# Patient Record
Sex: FEMALE | Race: WHITE | NOT HISPANIC OR LATINO | URBAN - NONMETROPOLITAN AREA
[De-identification: names, ages, dates, MRNs, and addresses within clinical notes are randomized per-mention and may not be internally consistent; named-entity substitution may affect disease eponyms.]

---

## 2017-01-23 RX ORDER — MEMANTINE HYDROCHLORIDE 5 MG/1
TABLET ORAL
Qty: 30 TABLET | Refills: 5 | Status: SHIPPED | OUTPATIENT
Start: 2017-01-23 | End: 2017-03-02 | Stop reason: SDUPTHER

## 2017-01-23 RX ORDER — WARFARIN SODIUM 5 MG/1
TABLET ORAL
Qty: 30 TABLET | Refills: 5 | Status: SHIPPED | OUTPATIENT
Start: 2017-01-23 | End: 2017-03-02 | Stop reason: SDUPTHER

## 2017-01-30 RX ORDER — SIMVASTATIN 40 MG
40 TABLET ORAL DAILY
Qty: 30 TABLET | Refills: 5 | Status: SHIPPED | OUTPATIENT
Start: 2017-01-30 | End: 2017-03-02 | Stop reason: SDUPTHER

## 2017-01-30 RX ORDER — PAROXETINE HYDROCHLORIDE 20 MG/1
TABLET, FILM COATED ORAL
Qty: 90 TABLET | Refills: 1 | Status: SHIPPED | OUTPATIENT
Start: 2017-01-30 | End: 2017-03-02 | Stop reason: SDUPTHER

## 2017-01-30 RX ORDER — HYDROXYUREA 500 MG/1
500 CAPSULE ORAL DAILY
Qty: 45 CAPSULE | Refills: 5 | Status: SHIPPED | OUTPATIENT
Start: 2017-01-30 | End: 2017-03-02 | Stop reason: SDUPTHER

## 2017-01-30 RX ORDER — DONEPEZIL HYDROCHLORIDE 10 MG/1
10 TABLET, FILM COATED ORAL NIGHTLY
Qty: 30 TABLET | Refills: 5 | Status: SHIPPED | OUTPATIENT
Start: 2017-01-30 | End: 2017-03-02 | Stop reason: SDUPTHER

## 2017-03-02 ENCOUNTER — OFFICE VISIT (OUTPATIENT)
Dept: FAMILY MEDICINE CLINIC | Facility: CLINIC | Age: 67
End: 2017-03-02

## 2017-03-02 VITALS
WEIGHT: 127.4 LBS | SYSTOLIC BLOOD PRESSURE: 128 MMHG | HEIGHT: 62 IN | HEART RATE: 102 BPM | DIASTOLIC BLOOD PRESSURE: 70 MMHG | BODY MASS INDEX: 23.45 KG/M2 | OXYGEN SATURATION: 93 %

## 2017-03-02 DIAGNOSIS — G47.00 INSOMNIA, UNSPECIFIED TYPE: Primary | Chronic | ICD-10-CM

## 2017-03-02 DIAGNOSIS — E11.9 TYPE 2 DIABETES MELLITUS WITHOUT COMPLICATION, UNSPECIFIED LONG TERM INSULIN USE STATUS: Chronic | ICD-10-CM

## 2017-03-02 DIAGNOSIS — F17.200 SMOKER: Chronic | ICD-10-CM

## 2017-03-02 DIAGNOSIS — F32.A DEPRESSION, UNSPECIFIED DEPRESSION TYPE: Chronic | ICD-10-CM

## 2017-03-02 DIAGNOSIS — I63.9 CEREBROVASCULAR ACCIDENT (CVA), UNSPECIFIED MECHANISM (HCC): ICD-10-CM

## 2017-03-02 DIAGNOSIS — F17.200 HEAVY SMOKER: ICD-10-CM

## 2017-03-02 DIAGNOSIS — Z12.2 ENCOUNTER FOR SCREENING FOR LUNG CANCER: ICD-10-CM

## 2017-03-02 DIAGNOSIS — F03.90 DEMENTIA WITHOUT BEHAVIORAL DISTURBANCE, UNSPECIFIED DEMENTIA TYPE: Chronic | ICD-10-CM

## 2017-03-02 DIAGNOSIS — Z12.31 VISIT FOR SCREENING MAMMOGRAM: ICD-10-CM

## 2017-03-02 DIAGNOSIS — K21.00 REFLUX ESOPHAGITIS: Chronic | ICD-10-CM

## 2017-03-02 DIAGNOSIS — J44.9 CHRONIC OBSTRUCTIVE PULMONARY DISEASE, UNSPECIFIED COPD TYPE (HCC): Chronic | ICD-10-CM

## 2017-03-02 DIAGNOSIS — Z12.2 ENCOUNTER FOR SCREENING FOR CANCER OF RESPIRATORY ORGANS: ICD-10-CM

## 2017-03-02 DIAGNOSIS — E78.5 HYPERLIPIDEMIA, UNSPECIFIED: Chronic | ICD-10-CM

## 2017-03-02 DIAGNOSIS — Z00.00 ROUTINE MEDICAL EXAM: ICD-10-CM

## 2017-03-02 PROCEDURE — 99214 OFFICE O/P EST MOD 30 MIN: CPT | Performed by: INTERNAL MEDICINE

## 2017-03-02 RX ORDER — FLUOROURACIL 50 MG/G
CREAM TOPICAL DAILY
Qty: 40 G | Refills: 5 | Status: SHIPPED | OUTPATIENT
Start: 2017-03-02 | End: 2017-10-02

## 2017-03-02 RX ORDER — MEMANTINE HYDROCHLORIDE 5 MG/1
TABLET ORAL
Qty: 30 TABLET | Refills: 5 | Status: SHIPPED | OUTPATIENT
Start: 2017-03-02 | End: 2017-08-15 | Stop reason: SDUPTHER

## 2017-03-02 RX ORDER — WARFARIN SODIUM 5 MG/1
TABLET ORAL
Qty: 30 TABLET | Refills: 5 | Status: SHIPPED | OUTPATIENT
Start: 2017-03-02 | End: 2017-08-15 | Stop reason: SDUPTHER

## 2017-03-02 RX ORDER — PANTOPRAZOLE SODIUM 40 MG/1
40 TABLET, DELAYED RELEASE ORAL DAILY
Qty: 30 TABLET | Refills: 5 | Status: SHIPPED | OUTPATIENT
Start: 2017-03-02 | End: 2017-08-15 | Stop reason: SDUPTHER

## 2017-03-02 RX ORDER — ZOLPIDEM TARTRATE 10 MG/1
10 TABLET ORAL NIGHTLY PRN
Qty: 30 TABLET | Refills: 5 | Status: SHIPPED | OUTPATIENT
Start: 2017-03-02 | End: 2017-08-15 | Stop reason: SDUPTHER

## 2017-03-02 RX ORDER — HYDROXYUREA 500 MG/1
500 CAPSULE ORAL DAILY
Qty: 45 CAPSULE | Refills: 5 | Status: SHIPPED | OUTPATIENT
Start: 2017-03-02 | End: 2017-08-15 | Stop reason: SDUPTHER

## 2017-03-02 RX ORDER — RANITIDINE 150 MG/1
150 TABLET ORAL DAILY
Qty: 30 TABLET | Refills: 5 | Status: SHIPPED | OUTPATIENT
Start: 2017-03-02 | End: 2017-08-15

## 2017-03-02 RX ORDER — PAROXETINE HYDROCHLORIDE 20 MG/1
20 TABLET, FILM COATED ORAL DAILY
Qty: 90 TABLET | Refills: 1 | Status: SHIPPED | OUTPATIENT
Start: 2017-03-02 | End: 2017-08-15 | Stop reason: SDUPTHER

## 2017-03-02 RX ORDER — VALSARTAN 80 MG/1
80 TABLET ORAL DAILY
Qty: 30 TABLET | Refills: 5 | Status: SHIPPED | OUTPATIENT
Start: 2017-03-02 | End: 2017-08-15

## 2017-03-02 RX ORDER — SIMVASTATIN 40 MG
40 TABLET ORAL DAILY
Qty: 30 TABLET | Refills: 5 | Status: SHIPPED | OUTPATIENT
Start: 2017-03-02 | End: 2017-08-15 | Stop reason: SDUPTHER

## 2017-03-02 RX ORDER — CHOLECALCIFEROL (VITAMIN D3) 125 MCG
50000 TABLET ORAL 2 TIMES WEEKLY
Qty: 8 TABLET | Refills: 5 | Status: SHIPPED | OUTPATIENT
Start: 2017-03-02 | End: 2017-08-15

## 2017-03-02 RX ORDER — WARFARIN SODIUM 1 MG/1
TABLET ORAL
Qty: 60 TABLET | Refills: 5 | Status: SHIPPED | OUTPATIENT
Start: 2017-03-02 | End: 2017-08-15 | Stop reason: SDUPTHER

## 2017-03-02 RX ORDER — DONEPEZIL HYDROCHLORIDE 10 MG/1
10 TABLET, FILM COATED ORAL NIGHTLY
Qty: 30 TABLET | Refills: 5 | Status: SHIPPED | OUTPATIENT
Start: 2017-03-02 | End: 2017-08-15 | Stop reason: SDUPTHER

## 2017-03-02 NOTE — PROGRESS NOTES
Subjective   Nona Hernandez is a 66 y.o. female.     Chief Complaint   Patient presents with   • Med Refill     6  month med refill        History of Present Illness     Pt in f/u on depression, hyperlipidemia, DM, and reflux.    Pt says she is doing pretty good.  Her memory is doing pretty good, except when she runs out of her medications.  She went about a week with out the medication and she had some problems with the memory, but otherwise she does good.          The following portions of the patient's history were reviewed and updated as appropriate: allergies, current medications, past family history, past medical history, past social history, past surgical history and problem list.    Review of Systems   Constitutional: Negative for activity change, appetite change, fatigue and unexpected weight change.   HENT: Negative for ear pain, facial swelling and hearing loss.    Respiratory: Positive for wheezing. Negative for cough, chest tightness and shortness of breath.    Cardiovascular: Negative for chest pain, palpitations and leg swelling.   Gastrointestinal: Negative for abdominal pain.   Genitourinary: Negative for difficulty urinating, dysuria, flank pain, frequency and urgency.   Musculoskeletal: Negative for arthralgias and back pain.   Skin: Negative for color change and rash.   Allergic/Immunologic: Negative for environmental allergies and food allergies.   Neurological: Negative for dizziness, syncope, weakness, numbness and headaches.   Hematological: Negative for adenopathy.   Psychiatric/Behavioral: Negative for confusion, decreased concentration, dysphoric mood, sleep disturbance and suicidal ideas. The patient is not nervous/anxious.    All other systems reviewed and are negative.      Objective   Physical Exam   Constitutional: She is oriented to person, place, and time. Vital signs are normal. She appears well-developed and well-nourished.   HENT:   Head: Normocephalic.   Right Ear: External ear  normal.   Left Ear: External ear normal.   Nose: Nose normal.   Mouth/Throat: Oropharynx is clear and moist.   Eyes: Conjunctivae and EOM are normal. Pupils are equal, round, and reactive to light.   Neck: Normal range of motion. Neck supple. No JVD present. No thyromegaly present.   Cardiovascular: Normal rate, regular rhythm, normal heart sounds and intact distal pulses.    No murmur heard.  Pulmonary/Chest: Effort normal. No respiratory distress. She has wheezes. She has no rales. She exhibits no tenderness.   Abdominal: Soft. Bowel sounds are normal. She exhibits no distension and no mass. There is no tenderness. There is no rebound and no guarding. No hernia.   Musculoskeletal: Normal range of motion. She exhibits no edema, tenderness or deformity.   Lymphadenopathy:     She has no cervical adenopathy.   Neurological: She is alert and oriented to person, place, and time. No cranial nerve deficit. Coordination normal.   Skin: Skin is warm and dry. No rash noted. No pallor.   Psychiatric: She has a normal mood and affect. Her behavior is normal. Judgment and thought content normal. Her mood appears not anxious. She does not exhibit a depressed mood. She expresses no homicidal and no suicidal ideation. She expresses no suicidal plans and no homicidal plans.   Nursing note and vitals reviewed.      Assessment/Plan   Nona was seen today for med refill.    Diagnoses and all orders for this visit:    Insomnia, unspecified type    Depression, unspecified depression type    Hyperlipidemia, unspecified    Reflux esophagitis    Type 2 diabetes mellitus without complication, unspecified long term insulin use status  -     Protein, Urine, Random; Future  -     Hemoglobin A1c; Future  -     Basic Metabolic Panel  -     MicroAlbumin, Urine, Random; Future    Chronic obstructive pulmonary disease, unspecified COPD type    Dementia without behavioral disturbance, unspecified dementia type    Encounter for screening for lung  cancer    Routine medical exam  -     CBC & Differential  -     TSH; Future  -     Comprehensive Metabolic Panel; Future  -     Lipid Panel; Future  -     T4; Future    Encounter for screening for cancer of respiratory organs    Visit for screening mammogram  -     Mammo screening digital tomosynthesis bilateral w CAD; Future    Smoker    Cerebrovascular accident (CVA), unspecified mechanism    Other orders  -     donepezil (ARICEPT) 10 MG tablet; Take 1 tablet by mouth Every Night.  -     Ergocalciferol (VITAMIN D2) 2000 UNITS tablet; Take 50,000 Units by mouth 2 (Two) Times a Week.  -     fluorouracil (EFUDEX) 5 % cream; Apply  topically Daily.  -     hydroxyurea (HYDREA) 500 MG capsule; Take 1 capsule by mouth Daily. M-SUN, MWF TAKES 2 times daily  -     memantine (NAMENDA) 5 MG tablet; Take 1 tablet daily  -     metFORMIN (GLUCOPHAGE) 500 MG tablet; Take 1 tablet by mouth 2 (Two) Times a Day With Meals.  -     pantoprazole (PROTONIX) 40 MG EC tablet; Take 1 tablet by mouth Daily.  -     PARoxetine (PAXIL) 20 MG tablet; Take 1 tablet by mouth Daily.  -     raNITIdine (ZANTAC) 150 MG tablet; Take 1 tablet by mouth Daily.  -     simvastatin (ZOCOR) 40 MG tablet; Take 1 tablet by mouth Daily.  -     valsartan (DIOVAN) 80 MG tablet; Take 1 tablet by mouth Daily.  -     warfarin (COUMADIN) 1 MG tablet; Take 2 tablets by mouth daily  -     warfarin (COUMADIN) 5 MG tablet; Take 1 tablet daily  -     zolpidem (AMBIEN) 10 MG tablet; Take 1 tablet by mouth At Night As Needed for sleep.  -     Cancel: CT Chest Low Dose Wo; Future        Refill medications    Recommended to the patient to quit smoking.  Explained risks of continuing, and benefits of quitting.   She smokes about a pack a day.      CT Screen  Schedule Low Dose CT Screen - Greater than 30 packs a year history and patient is between the ages of 55 and 80 years old.  You must use ICD 10 code Z12.2     DM labs due: CBC CMP Lipids Thyroids A1C Urine Protein and  Micro Albumin  RTC 2 weeks after the labs to review them     It is recommended to this patient that they get a Diabetic eye exam and a Diabetic foot exam every year.      Schedule Mammo     Scribed for Dr. Gonzalez by Roshni Gonsalez 03/02/17

## 2017-04-03 RX ORDER — VALSARTAN 80 MG/1
TABLET ORAL
Qty: 30 TABLET | Refills: 5 | Status: SHIPPED | OUTPATIENT
Start: 2017-04-03 | End: 2017-08-15 | Stop reason: SDUPTHER

## 2017-04-19 RX ORDER — ALBUTEROL SULFATE 90 UG/1
2 AEROSOL, METERED RESPIRATORY (INHALATION) 4 TIMES DAILY PRN
Qty: 2 INHALER | Refills: 5 | Status: SHIPPED | OUTPATIENT
Start: 2017-04-19 | End: 2017-08-15 | Stop reason: SDUPTHER

## 2017-06-05 RX ORDER — TIOTROPIUM BROMIDE 18 UG/1
CAPSULE ORAL; RESPIRATORY (INHALATION)
Qty: 90 CAPSULE | Refills: 1 | Status: SHIPPED | OUTPATIENT
Start: 2017-06-05 | End: 2017-08-15 | Stop reason: SDUPTHER

## 2017-08-15 ENCOUNTER — OFFICE VISIT (OUTPATIENT)
Dept: FAMILY MEDICINE CLINIC | Facility: CLINIC | Age: 67
End: 2017-08-15

## 2017-08-15 VITALS
WEIGHT: 122.4 LBS | SYSTOLIC BLOOD PRESSURE: 110 MMHG | HEART RATE: 87 BPM | TEMPERATURE: 98.5 F | OXYGEN SATURATION: 96 % | DIASTOLIC BLOOD PRESSURE: 62 MMHG | BODY MASS INDEX: 22.52 KG/M2 | HEIGHT: 62 IN

## 2017-08-15 DIAGNOSIS — J44.9 CHRONIC OBSTRUCTIVE PULMONARY DISEASE, UNSPECIFIED COPD TYPE (HCC): Chronic | ICD-10-CM

## 2017-08-15 DIAGNOSIS — E11.9 TYPE 2 DIABETES MELLITUS WITHOUT COMPLICATION, WITHOUT LONG-TERM CURRENT USE OF INSULIN (HCC): ICD-10-CM

## 2017-08-15 DIAGNOSIS — F32.A DEPRESSION, UNSPECIFIED DEPRESSION TYPE: Chronic | ICD-10-CM

## 2017-08-15 DIAGNOSIS — K21.00 REFLUX ESOPHAGITIS: Chronic | ICD-10-CM

## 2017-08-15 DIAGNOSIS — E78.5 HYPERLIPIDEMIA, UNSPECIFIED HYPERLIPIDEMIA TYPE: ICD-10-CM

## 2017-08-15 DIAGNOSIS — F03.90 DEMENTIA WITHOUT BEHAVIORAL DISTURBANCE, UNSPECIFIED DEMENTIA TYPE: Chronic | ICD-10-CM

## 2017-08-15 DIAGNOSIS — G47.00 INSOMNIA, UNSPECIFIED TYPE: Primary | Chronic | ICD-10-CM

## 2017-08-15 DIAGNOSIS — E78.5 HYPERLIPIDEMIA, UNSPECIFIED: Chronic | ICD-10-CM

## 2017-08-15 DIAGNOSIS — Z79.899 LONG TERM USE OF DRUG: ICD-10-CM

## 2017-08-15 DIAGNOSIS — E11.9 TYPE 2 DIABETES MELLITUS WITHOUT COMPLICATION, UNSPECIFIED LONG TERM INSULIN USE STATUS: Chronic | ICD-10-CM

## 2017-08-15 DIAGNOSIS — I10 ESSENTIAL HYPERTENSION: Primary | ICD-10-CM

## 2017-08-15 PROCEDURE — 99214 OFFICE O/P EST MOD 30 MIN: CPT | Performed by: INTERNAL MEDICINE

## 2017-08-15 RX ORDER — VALSARTAN 80 MG/1
80 TABLET ORAL DAILY
Qty: 30 TABLET | Refills: 5 | Status: SHIPPED | OUTPATIENT
Start: 2017-08-15 | End: 2018-05-11 | Stop reason: SDUPTHER

## 2017-08-15 RX ORDER — ZOLPIDEM TARTRATE 10 MG/1
10 TABLET ORAL NIGHTLY PRN
Qty: 30 TABLET | Refills: 5 | Status: SHIPPED | OUTPATIENT
Start: 2017-08-15 | End: 2018-05-11 | Stop reason: SDUPTHER

## 2017-08-15 RX ORDER — MEMANTINE HYDROCHLORIDE 5 MG/1
TABLET ORAL
Qty: 30 TABLET | Refills: 5 | Status: SHIPPED | OUTPATIENT
Start: 2017-08-15 | End: 2018-05-11 | Stop reason: SDUPTHER

## 2017-08-15 RX ORDER — WARFARIN SODIUM 1 MG/1
TABLET ORAL
Qty: 60 TABLET | Refills: 5 | Status: SHIPPED | OUTPATIENT
Start: 2017-08-15 | End: 2017-11-20 | Stop reason: SDUPTHER

## 2017-08-15 RX ORDER — PAROXETINE HYDROCHLORIDE 20 MG/1
20 TABLET, FILM COATED ORAL DAILY
Qty: 30 TABLET | Refills: 5 | Status: SHIPPED | OUTPATIENT
Start: 2017-08-15 | End: 2018-01-22 | Stop reason: SDUPTHER

## 2017-08-15 RX ORDER — DONEPEZIL HYDROCHLORIDE 10 MG/1
10 TABLET, FILM COATED ORAL NIGHTLY
Qty: 30 TABLET | Refills: 5 | Status: SHIPPED | OUTPATIENT
Start: 2017-08-15 | End: 2018-01-22 | Stop reason: SDUPTHER

## 2017-08-15 RX ORDER — ALBUTEROL SULFATE 90 UG/1
2 AEROSOL, METERED RESPIRATORY (INHALATION) 4 TIMES DAILY PRN
Qty: 2 INHALER | Refills: 5 | Status: SHIPPED | OUTPATIENT
Start: 2017-08-15 | End: 2018-10-12 | Stop reason: SDUPTHER

## 2017-08-15 RX ORDER — WARFARIN SODIUM 5 MG/1
TABLET ORAL
Qty: 30 TABLET | Refills: 5 | Status: SHIPPED | OUTPATIENT
Start: 2017-08-15 | End: 2018-05-11 | Stop reason: DRUGHIGH

## 2017-08-15 RX ORDER — PANTOPRAZOLE SODIUM 40 MG/1
40 TABLET, DELAYED RELEASE ORAL DAILY
Qty: 30 TABLET | Refills: 5 | Status: SHIPPED | OUTPATIENT
Start: 2017-08-15 | End: 2018-05-11 | Stop reason: SDUPTHER

## 2017-08-15 RX ORDER — SIMVASTATIN 40 MG
40 TABLET ORAL DAILY
Qty: 30 TABLET | Refills: 5 | Status: SHIPPED | OUTPATIENT
Start: 2017-08-15 | End: 2018-01-23 | Stop reason: SDUPTHER

## 2017-08-15 RX ORDER — HYDROXYUREA 500 MG/1
500 CAPSULE ORAL DAILY
Qty: 45 CAPSULE | Refills: 5 | Status: SHIPPED | OUTPATIENT
Start: 2017-08-15 | End: 2018-02-20 | Stop reason: SDUPTHER

## 2017-08-15 NOTE — PROGRESS NOTES
Subjective   Nona Hernandez is a 67 y.o. female.     Chief Complaint   Patient presents with   • Med Refill     also c/o chills at times        History of Present Illness     Pt in f/u on insomnia, depression, hyperlipidemia, Dementia, reflux, DM, and COPD.     Pt says she is doing fine.  She states she has stayed busy and re-done her house this summer.  Pt says she thinks her memory is doing better except when she doesn't sleep at night, it is bad the next day.  Her dementia is stable at this time.     The following portions of the patient's history were reviewed and updated as appropriate: allergies, current medications, past family history, past medical history, past social history, past surgical history and problem list.    Review of Systems   Constitutional: Negative for activity change, appetite change, fatigue and unexpected weight change.   HENT: Negative for ear pain, facial swelling and hearing loss.    Respiratory: Negative for cough, chest tightness, shortness of breath and wheezing.    Cardiovascular: Negative for chest pain, palpitations and leg swelling.   Gastrointestinal: Negative for abdominal pain.   Genitourinary: Negative for difficulty urinating, dysuria, flank pain, frequency and urgency.   Musculoskeletal: Positive for arthralgias. Negative for back pain.   Skin: Negative for color change and rash.   Allergic/Immunologic: Negative for environmental allergies and food allergies.   Neurological: Negative for dizziness, syncope, weakness, numbness and headaches.   Hematological: Negative for adenopathy.   Psychiatric/Behavioral: Positive for confusion. Negative for decreased concentration, dysphoric mood, sleep disturbance and suicidal ideas. The patient is nervous/anxious.    All other systems reviewed and are negative.      Objective   Physical Exam   Constitutional: She is oriented to person, place, and time. Vital signs are normal. She appears well-developed and well-nourished.   HENT:    Head: Normocephalic.   Right Ear: External ear normal.   Left Ear: External ear normal.   Nose: Nose normal.   Mouth/Throat: Oropharynx is clear and moist.   Eyes: Conjunctivae and EOM are normal. Pupils are equal, round, and reactive to light.   Neck: Normal range of motion. Neck supple. No JVD present. No thyromegaly present.   Cardiovascular: Normal rate, regular rhythm, normal heart sounds and intact distal pulses.    No murmur heard.  Pulmonary/Chest: Effort normal and breath sounds normal. No respiratory distress. She has no wheezes. She has no rales. She exhibits no tenderness.   Abdominal: Soft. Bowel sounds are normal. She exhibits no distension and no mass. There is no tenderness. There is no rebound and no guarding. No hernia.   Musculoskeletal: Normal range of motion. She exhibits no edema, tenderness or deformity.   Lymphadenopathy:     She has no cervical adenopathy.   Neurological: She is alert and oriented to person, place, and time. No cranial nerve deficit. Coordination normal.   Skin: Skin is warm and dry. No rash noted. No pallor.   Psychiatric: Her behavior is normal. Judgment and thought content normal. Her mood appears anxious. Cognition and memory are impaired. She does not exhibit a depressed mood. She expresses no homicidal and no suicidal ideation. She expresses no suicidal plans and no homicidal plans.   Nursing note and vitals reviewed.      Assessment/Plan   Nona was seen today for med refill.    Diagnoses and all orders for this visit:    Insomnia, unspecified type    Depression, unspecified depression type    Hyperlipidemia, unspecified    Reflux esophagitis    Type 2 diabetes mellitus without complication, unspecified long term insulin use status    Chronic obstructive pulmonary disease, unspecified COPD type    Dementia without behavioral disturbance, unspecified dementia type    Other orders  -     albuterol (PROAIR HFA) 108 (90 Base) MCG/ACT inhaler; Inhale 2 puffs 4 (Four)  Times a Day As Needed for Wheezing.  -     Cyanocobalamin 1000 MCG/ML kit; Inject 1 mL as directed Every 30 (Thirty) Days.  -     donepezil (ARICEPT) 10 MG tablet; Take 1 tablet by mouth Every Night.  -     fluticasone-salmeterol (ADVAIR) 250-50 MCG/DOSE DISKUS; Inhale 1 puff 2 (Two) Times a Day.  -     hydroxyurea (HYDREA) 500 MG capsule; Take 1 capsule by mouth Daily. M-MEAGAN DUNN TAKES 2 times daily  -     memantine (NAMENDA) 5 MG tablet; Take 1 tablet daily  -     metFORMIN (GLUCOPHAGE) 500 MG tablet; Take 1 tablet by mouth 2 (Two) Times a Day With Meals.  -     pantoprazole (PROTONIX) 40 MG EC tablet; Take 1 tablet by mouth Daily.  -     PARoxetine (PAXIL) 20 MG tablet; Take 1 tablet by mouth Daily.  -     simvastatin (ZOCOR) 40 MG tablet; Take 1 tablet by mouth Daily.  -     tiotropium (SPIRIVA HANDIHALER) 18 MCG per inhalation capsule; Place 1 capsule into inhaler and inhale Daily.  -     valsartan (DIOVAN) 80 MG tablet; Take 1 tablet by mouth Daily.  -     warfarin (COUMADIN) 1 MG tablet; Take 2 tablets by mouth daily  -     warfarin (COUMADIN) 5 MG tablet; Take 1 tablet daily  -     zolpidem (AMBIEN) 10 MG tablet; Take 1 tablet by mouth At Night As Needed for Sleep.        DM labs due: CBC CMP Lipids Thyroids A1C Urine Protein and Micro Albumin  RTC 2 weeks after the labs to review them    Ambien level is due by next refill     It is recommended to this patient that they get a Diabetic eye exam and a Diabetic foot exam every year.      I advised the patient of the risks in continuing to use tobacco, and I provided this patient with smoking cessation educational details.  I also discussed how to quit smoking and if help was needed, there are medications that can help.      Refill medications if due    Scribed for Dr. Gonzalez by Roshni Gonsalez 08/15/17

## 2017-09-08 RX ORDER — RANITIDINE 150 MG/1
TABLET ORAL
Qty: 30 TABLET | Refills: 5 | Status: SHIPPED | OUTPATIENT
Start: 2017-09-08 | End: 2017-10-02

## 2017-09-08 RX ORDER — ERGOCALCIFEROL 1.25 MG/1
CAPSULE ORAL
Qty: 8 CAPSULE | Refills: 5 | Status: SHIPPED | OUTPATIENT
Start: 2017-09-08

## 2017-09-18 ENCOUNTER — TRANSCRIBE ORDERS (OUTPATIENT)
Dept: LAB | Facility: OTHER | Age: 67
End: 2017-09-18

## 2017-09-18 DIAGNOSIS — E11.9 DIABETES MELLITUS WITHOUT COMPLICATION (HCC): Primary | ICD-10-CM

## 2017-09-25 ENCOUNTER — LAB (OUTPATIENT)
Dept: LAB | Facility: OTHER | Age: 67
End: 2017-09-25

## 2017-09-25 ENCOUNTER — CLINICAL SUPPORT (OUTPATIENT)
Dept: FAMILY MEDICINE CLINIC | Facility: CLINIC | Age: 67
End: 2017-09-25

## 2017-09-25 DIAGNOSIS — Z79.899 LONG TERM USE OF DRUG: ICD-10-CM

## 2017-09-25 DIAGNOSIS — E78.5 HYPERLIPIDEMIA, UNSPECIFIED HYPERLIPIDEMIA TYPE: ICD-10-CM

## 2017-09-25 DIAGNOSIS — Z79.01 ANTICOAGULANT LONG-TERM USE: ICD-10-CM

## 2017-09-25 DIAGNOSIS — E11.9 DIABETES MELLITUS WITHOUT COMPLICATION (HCC): ICD-10-CM

## 2017-09-25 DIAGNOSIS — I10 ESSENTIAL HYPERTENSION: ICD-10-CM

## 2017-09-25 DIAGNOSIS — Z23 FLU VACCINE NEED: Primary | ICD-10-CM

## 2017-09-25 LAB
ALBUMIN SERPL-MCNC: 4 G/DL (ref 3.2–5.5)
ALBUMIN/GLOB SERPL: 1.3 G/DL (ref 1–3)
ALP SERPL-CCNC: 43 U/L (ref 15–121)
ALT SERPL W P-5'-P-CCNC: 19 U/L (ref 10–60)
ANION GAP SERPL CALCULATED.3IONS-SCNC: 10 MMOL/L (ref 5–15)
ANISOCYTOSIS BLD QL: NORMAL
AST SERPL-CCNC: 26 U/L (ref 10–60)
BILIRUB SERPL-MCNC: 0.8 MG/DL (ref 0.2–1)
BUN BLD-MCNC: 9 MG/DL (ref 8–25)
BUN/CREAT SERPL: 15 (ref 7–25)
CALCIUM SPEC-SCNC: 9.5 MG/DL (ref 8.4–10.8)
CHLORIDE SERPL-SCNC: 99 MMOL/L (ref 100–112)
CHOLEST SERPL-MCNC: 160 MG/DL (ref 150–200)
CO2 SERPL-SCNC: 32 MMOL/L (ref 20–32)
CREAT BLD-MCNC: 0.6 MG/DL (ref 0.4–1.3)
DEPRECATED RDW RBC AUTO: 47.8 FL (ref 36.4–46.3)
EOSINOPHIL # BLD MANUAL: 0.26 10*3/MM3 (ref 0–0.7)
EOSINOPHIL NFR BLD MANUAL: 3 % (ref 0–7)
ERYTHROCYTE [DISTWIDTH] IN BLOOD BY AUTOMATED COUNT: 12 % (ref 11.5–14.5)
GFR SERPL CREATININE-BSD FRML MDRD: 99 ML/MIN/1.73 (ref 45–104)
GLOBULIN UR ELPH-MCNC: 3.1 GM/DL (ref 2.5–4.6)
GLUCOSE BLD-MCNC: 151 MG/DL (ref 70–100)
HCT VFR BLD AUTO: 44 % (ref 35–45)
HDLC SERPL-MCNC: 52 MG/DL (ref 35–100)
HGB BLD-MCNC: 15.2 G/DL (ref 12–15.5)
INR PPP: 3.67 (ref 0.8–1.2)
LARGE PLATELETS: NORMAL
LDLC SERPL CALC-MCNC: 90 MG/DL
LDLC/HDLC SERPL: 1.73 {RATIO}
LYMPHOCYTES # BLD MANUAL: 2.85 10*3/MM3 (ref 0.6–4.2)
LYMPHOCYTES NFR BLD MANUAL: 33 % (ref 10–50)
LYMPHOCYTES NFR BLD MANUAL: 9 % (ref 0–12)
MACROCYTES BLD QL SMEAR: NORMAL
MCH RBC QN AUTO: 37.7 PG (ref 26.5–34)
MCHC RBC AUTO-ENTMCNC: 34.5 G/DL (ref 31.4–36)
MCV RBC AUTO: 109.2 FL (ref 80–98)
MONOCYTES # BLD AUTO: 0.78 10*3/MM3 (ref 0–0.9)
NEUTROPHILS # BLD AUTO: 4.75 10*3/MM3 (ref 2–8.6)
NEUTROPHILS NFR BLD MANUAL: 55 % (ref 37–80)
PLATELET # BLD AUTO: 506 10*3/MM3 (ref 150–450)
PMV BLD AUTO: 10.3 FL (ref 8–12)
POTASSIUM BLD-SCNC: 4.4 MMOL/L (ref 3.4–5.4)
PROT SERPL-MCNC: 7.1 G/DL (ref 6.7–8.2)
PROTHROMBIN TIME: 37 SECONDS (ref 11.1–15.3)
RBC # BLD AUTO: 4.03 10*6/MM3 (ref 3.77–5.16)
SCAN SLIDE: NORMAL
SMALL PLATELETS BLD QL SMEAR: NORMAL
SODIUM BLD-SCNC: 141 MMOL/L (ref 134–146)
TRIGL SERPL-MCNC: 89 MG/DL (ref 35–160)
VLDLC SERPL-MCNC: 17.8 MG/DL
WBC MORPH BLD: NORMAL
WBC NRBC COR # BLD: 8.63 10*3/MM3 (ref 3.2–9.8)

## 2017-09-25 PROCEDURE — 90662 IIV NO PRSV INCREASED AG IM: CPT | Performed by: INTERNAL MEDICINE

## 2017-09-25 PROCEDURE — 90471 IMMUNIZATION ADMIN: CPT | Performed by: INTERNAL MEDICINE

## 2017-09-25 PROCEDURE — 85025 COMPLETE CBC W/AUTO DIFF WBC: CPT | Performed by: INTERNAL MEDICINE

## 2017-09-25 PROCEDURE — 36415 COLL VENOUS BLD VENIPUNCTURE: CPT | Performed by: INTERNAL MEDICINE

## 2017-09-25 PROCEDURE — 85610 PROTHROMBIN TIME: CPT | Performed by: INTERNAL MEDICINE

## 2017-09-25 PROCEDURE — 80053 COMPREHEN METABOLIC PANEL: CPT | Performed by: INTERNAL MEDICINE

## 2017-09-25 PROCEDURE — 80061 LIPID PANEL: CPT | Performed by: INTERNAL MEDICINE

## 2017-09-25 PROCEDURE — 83036 HEMOGLOBIN GLYCOSYLATED A1C: CPT | Performed by: INTERNAL MEDICINE

## 2017-09-25 RX ORDER — VALSARTAN 80 MG/1
TABLET ORAL
Qty: 30 TABLET | Refills: 5 | Status: SHIPPED | OUTPATIENT
Start: 2017-09-25 | End: 2017-10-02 | Stop reason: SDUPTHER

## 2017-09-25 RX ORDER — PANTOPRAZOLE SODIUM 40 MG/1
TABLET, DELAYED RELEASE ORAL
Qty: 30 TABLET | Refills: 5 | Status: SHIPPED | OUTPATIENT
Start: 2017-09-25 | End: 2017-10-02 | Stop reason: SDUPTHER

## 2017-09-26 LAB — HBA1C MFR BLD: 6.7 % (ref 4–5.6)

## 2017-09-29 ENCOUNTER — TELEPHONE (OUTPATIENT)
Dept: FAMILY MEDICINE CLINIC | Facility: CLINIC | Age: 67
End: 2017-09-29

## 2017-09-29 DIAGNOSIS — I25.10 CAD, MULTIPLE VESSEL: ICD-10-CM

## 2017-09-29 DIAGNOSIS — R79.89 INCREASED PLATELET COUNT: ICD-10-CM

## 2017-09-29 DIAGNOSIS — Z79.899 HIGH RISK MEDICATION USE: Primary | ICD-10-CM

## 2017-09-29 NOTE — TELEPHONE ENCOUNTER
Call placed to pt at this time concerning recent INR and checking to see if she was taking coumadin, did she hold her coumadin, did she talk with Dr. Valencia about her INR like pt told Mariposa PUGA on 9/25.  Pt said that Erik she has not seen in a very long time and he doesn't call her about her INR, I think pt is a little confused.  MD wants pt to hold coumadin until Monday and recent INR on Monday and Dr. Gonzalez will get results for INR and will talk with pt about taking another form of medication so she does not have to worry with INR blood work.  Pt voiced back understanding at this time.

## 2017-10-02 ENCOUNTER — OFFICE VISIT (OUTPATIENT)
Dept: FAMILY MEDICINE CLINIC | Facility: CLINIC | Age: 67
End: 2017-10-02

## 2017-10-02 VITALS
DIASTOLIC BLOOD PRESSURE: 78 MMHG | SYSTOLIC BLOOD PRESSURE: 120 MMHG | BODY MASS INDEX: 24.25 KG/M2 | WEIGHT: 131.8 LBS | HEART RATE: 73 BPM | OXYGEN SATURATION: 98 % | HEIGHT: 62 IN

## 2017-10-02 DIAGNOSIS — J44.9 CHRONIC OBSTRUCTIVE PULMONARY DISEASE, UNSPECIFIED COPD TYPE (HCC): Chronic | ICD-10-CM

## 2017-10-02 DIAGNOSIS — F03.90 DEMENTIA WITHOUT BEHAVIORAL DISTURBANCE, UNSPECIFIED DEMENTIA TYPE: Chronic | ICD-10-CM

## 2017-10-02 DIAGNOSIS — G47.00 INSOMNIA, UNSPECIFIED TYPE: Chronic | ICD-10-CM

## 2017-10-02 DIAGNOSIS — E11.9 TYPE 2 DIABETES MELLITUS WITHOUT COMPLICATION, WITHOUT LONG-TERM CURRENT USE OF INSULIN (HCC): Chronic | ICD-10-CM

## 2017-10-02 DIAGNOSIS — F32.A DEPRESSION, UNSPECIFIED DEPRESSION TYPE: Primary | Chronic | ICD-10-CM

## 2017-10-02 PROCEDURE — 99214 OFFICE O/P EST MOD 30 MIN: CPT | Performed by: INTERNAL MEDICINE

## 2017-10-02 RX ORDER — BUPROPION HYDROCHLORIDE 150 MG/1
150 TABLET ORAL DAILY
Qty: 30 TABLET | Refills: 5 | Status: SHIPPED | OUTPATIENT
Start: 2017-10-02 | End: 2018-05-11 | Stop reason: SDUPTHER

## 2017-10-02 NOTE — PROGRESS NOTES
Subjective   Nona Hernandez is a 67 y.o. female.     Chief Complaint   Patient presents with   • Results     lab results   • Depression        History of Present Illness     Pt in f/u on DM, insomnia, dementia, COPD, and had stopped smoking.     Pt says she is doing pretty good.  She has quit smoking recently for about 3 weeks now and says that she is having a problem with depression now.  She does have some good days, but sometimes there is some really bad days where she is just really down.  She admits that the quitting smoking really hasn't bothered her to where she wants to smoke again, just has some down days.  She also has had some moments where she will get chilled feeling and then feels like the tempeture gets back to normal.  She is otherwise doing okay and is tolerating her medications well.     The following portions of the patient's history were reviewed and updated as appropriate: allergies, current medications, past family history, past medical history, past social history, past surgical history and problem list.    Review of Systems   Constitutional: Negative for activity change, appetite change, fatigue and unexpected weight change.   HENT: Negative for ear pain, facial swelling and hearing loss.    Respiratory: Negative for cough, chest tightness, shortness of breath and wheezing.    Cardiovascular: Negative for chest pain, palpitations and leg swelling.   Gastrointestinal: Negative for abdominal pain.   Genitourinary: Negative for difficulty urinating, dysuria, flank pain, frequency and urgency.   Musculoskeletal: Negative for arthralgias and back pain.   Skin: Negative for color change and rash.   Allergic/Immunologic: Negative for environmental allergies and food allergies.   Neurological: Negative for dizziness, syncope, weakness, numbness and headaches.   Hematological: Negative for adenopathy.   Psychiatric/Behavioral: Positive for dysphoric mood. Negative for confusion, decreased  concentration, sleep disturbance and suicidal ideas. The patient is not nervous/anxious.    All other systems reviewed and are negative.      Objective   Physical Exam   Constitutional: She is oriented to person, place, and time. Vital signs are normal. She appears well-developed and well-nourished.   HENT:   Head: Normocephalic.   Right Ear: External ear normal.   Left Ear: External ear normal.   Nose: Nose normal.   Mouth/Throat: Oropharynx is clear and moist.   Eyes: Conjunctivae and EOM are normal. Pupils are equal, round, and reactive to light.   Neck: Normal range of motion. Neck supple. No JVD present. No thyromegaly present.   Cardiovascular: Normal rate, regular rhythm, normal heart sounds and intact distal pulses.    No murmur heard.  Pulmonary/Chest: Effort normal and breath sounds normal. No respiratory distress. She has no wheezes. She has no rales. She exhibits no tenderness.   Abdominal: Soft. Bowel sounds are normal. She exhibits no distension and no mass. There is no tenderness. There is no rebound and no guarding. No hernia.   Musculoskeletal: Normal range of motion. She exhibits tenderness. She exhibits no edema or deformity.   Lymphadenopathy:     She has no cervical adenopathy.   Neurological: She is alert and oriented to person, place, and time. No cranial nerve deficit. Coordination normal.   Skin: Skin is warm and dry. No rash noted. No pallor.   Psychiatric: Her behavior is normal. Judgment and thought content normal. Her mood appears not anxious. Cognition and memory are impaired. She exhibits a depressed mood. She expresses no homicidal and no suicidal ideation. She expresses no suicidal plans and no homicidal plans.   Nursing note and vitals reviewed.      Assessment/Plan   Nona was seen today for results and depression.    Diagnoses and all orders for this visit:    Depression, unspecified depression type    Type 2 diabetes mellitus without complication, without long-term current use  of insulin    Insomnia, unspecified type    Chronic obstructive pulmonary disease, unspecified COPD type    Dementia without behavioral disturbance, unspecified dementia type           Add Wellbutrin XR 150mg daily for depression    Already ordered Protime/INR, CBC, and B12 level - make sure these are done before the next visit.      RTC 2 weeks from Friday to evaluate depression medication and will review labs then.     Scribed for Dr. Gonzalez by [facility] 10/02/17

## 2017-10-23 RX ORDER — WARFARIN SODIUM 1 MG/1
TABLET ORAL
Qty: 60 TABLET | Refills: 5 | Status: SHIPPED | OUTPATIENT
Start: 2017-10-23 | End: 2018-05-11 | Stop reason: DRUGHIGH

## 2017-10-31 ENCOUNTER — LAB (OUTPATIENT)
Dept: LAB | Facility: OTHER | Age: 67
End: 2017-10-31

## 2017-10-31 ENCOUNTER — OFFICE VISIT (OUTPATIENT)
Dept: FAMILY MEDICINE CLINIC | Facility: CLINIC | Age: 67
End: 2017-10-31

## 2017-10-31 VITALS
HEIGHT: 62 IN | WEIGHT: 136.6 LBS | HEART RATE: 86 BPM | SYSTOLIC BLOOD PRESSURE: 116 MMHG | OXYGEN SATURATION: 95 % | BODY MASS INDEX: 25.14 KG/M2 | DIASTOLIC BLOOD PRESSURE: 70 MMHG

## 2017-10-31 DIAGNOSIS — I25.10 CAD, MULTIPLE VESSEL: ICD-10-CM

## 2017-10-31 DIAGNOSIS — E78.01 FAMILIAL HYPERCHOLESTEROLEMIA: Chronic | ICD-10-CM

## 2017-10-31 DIAGNOSIS — F41.9 ANXIETY: Chronic | ICD-10-CM

## 2017-10-31 DIAGNOSIS — J44.9 CHRONIC OBSTRUCTIVE PULMONARY DISEASE, UNSPECIFIED COPD TYPE (HCC): Chronic | ICD-10-CM

## 2017-10-31 DIAGNOSIS — R79.89 INCREASED PLATELET COUNT: ICD-10-CM

## 2017-10-31 DIAGNOSIS — F33.1 DEPRESSION, MAJOR, RECURRENT, MODERATE (HCC): Primary | Chronic | ICD-10-CM

## 2017-10-31 DIAGNOSIS — F03.90 DEMENTIA WITHOUT BEHAVIORAL DISTURBANCE, UNSPECIFIED DEMENTIA TYPE: Chronic | ICD-10-CM

## 2017-10-31 DIAGNOSIS — K21.00 REFLUX ESOPHAGITIS: Chronic | ICD-10-CM

## 2017-10-31 DIAGNOSIS — E11.9 TYPE 2 DIABETES MELLITUS WITHOUT COMPLICATION, WITHOUT LONG-TERM CURRENT USE OF INSULIN (HCC): Chronic | ICD-10-CM

## 2017-10-31 LAB
DEPRECATED RDW RBC AUTO: 49.8 FL (ref 36.4–46.3)
ERYTHROCYTE [DISTWIDTH] IN BLOOD BY AUTOMATED COUNT: 12.8 % (ref 11.5–14.5)
HCT VFR BLD AUTO: 42.6 % (ref 35–45)
HGB BLD-MCNC: 14.2 G/DL (ref 12–15.5)
INR PPP: 1.5 (ref 0.8–1.2)
LYMPHOCYTES # BLD MANUAL: 2.67 10*3/MM3 (ref 0.6–4.2)
LYMPHOCYTES NFR BLD MANUAL: 27 % (ref 10–50)
LYMPHOCYTES NFR BLD MANUAL: 9 % (ref 0–12)
MACROCYTES BLD QL SMEAR: NORMAL
MCH RBC QN AUTO: 36.6 PG (ref 26.5–34)
MCHC RBC AUTO-ENTMCNC: 33.3 G/DL (ref 31.4–36)
MCV RBC AUTO: 109.8 FL (ref 80–98)
MONOCYTES # BLD AUTO: 0.89 10*3/MM3 (ref 0–0.9)
NEUTROPHILS # BLD AUTO: 6.33 10*3/MM3 (ref 2–8.6)
NEUTROPHILS NFR BLD MANUAL: 63 % (ref 37–80)
NEUTS BAND NFR BLD MANUAL: 1 % (ref 0–5)
PLAT MORPH BLD: NORMAL
PLATELET # BLD AUTO: 446 10*3/MM3 (ref 150–450)
PMV BLD AUTO: 10.2 FL (ref 8–12)
PROTHROMBIN TIME: 18.1 SECONDS (ref 11.1–15.3)
RBC # BLD AUTO: 3.88 10*6/MM3 (ref 3.77–5.16)
SCAN SLIDE: NORMAL
WBC MORPH BLD: NORMAL
WBC NRBC COR # BLD: 9.89 10*3/MM3 (ref 3.2–9.8)

## 2017-10-31 PROCEDURE — 36415 COLL VENOUS BLD VENIPUNCTURE: CPT | Performed by: INTERNAL MEDICINE

## 2017-10-31 PROCEDURE — 85025 COMPLETE CBC W/AUTO DIFF WBC: CPT | Performed by: INTERNAL MEDICINE

## 2017-10-31 PROCEDURE — 85610 PROTHROMBIN TIME: CPT | Performed by: INTERNAL MEDICINE

## 2017-10-31 PROCEDURE — 82607 VITAMIN B-12: CPT | Performed by: INTERNAL MEDICINE

## 2017-10-31 PROCEDURE — 99214 OFFICE O/P EST MOD 30 MIN: CPT | Performed by: INTERNAL MEDICINE

## 2017-11-01 DIAGNOSIS — R53.83 FATIGUE, UNSPECIFIED TYPE: Primary | ICD-10-CM

## 2017-11-01 LAB — VIT B12 BLD-MCNC: 735 PG/ML (ref 239–931)

## 2017-11-20 ENCOUNTER — OFFICE VISIT (OUTPATIENT)
Dept: FAMILY MEDICINE CLINIC | Facility: CLINIC | Age: 67
End: 2017-11-20

## 2017-11-20 ENCOUNTER — LAB (OUTPATIENT)
Dept: LAB | Facility: OTHER | Age: 67
End: 2017-11-20

## 2017-11-20 VITALS
DIASTOLIC BLOOD PRESSURE: 70 MMHG | TEMPERATURE: 99.5 F | BODY MASS INDEX: 24.62 KG/M2 | HEIGHT: 62 IN | WEIGHT: 133.8 LBS | OXYGEN SATURATION: 82 % | SYSTOLIC BLOOD PRESSURE: 132 MMHG

## 2017-11-20 DIAGNOSIS — R53.83 OTHER FATIGUE: ICD-10-CM

## 2017-11-20 DIAGNOSIS — J44.9 CHRONIC OBSTRUCTIVE PULMONARY DISEASE, UNSPECIFIED COPD TYPE (HCC): Chronic | ICD-10-CM

## 2017-11-20 DIAGNOSIS — F17.200 SMOKER: Chronic | ICD-10-CM

## 2017-11-20 DIAGNOSIS — E11.9 TYPE 2 DIABETES MELLITUS WITHOUT COMPLICATION, WITHOUT LONG-TERM CURRENT USE OF INSULIN (HCC): ICD-10-CM

## 2017-11-20 DIAGNOSIS — R05.9 COUGHING: Primary | ICD-10-CM

## 2017-11-20 DIAGNOSIS — R05.9 COUGHING: ICD-10-CM

## 2017-11-20 DIAGNOSIS — J40 BRONCHITIS: ICD-10-CM

## 2017-11-20 LAB
FLUAV AG NPH QL: NEGATIVE
FLUBV AG NPH QL IA: NEGATIVE

## 2017-11-20 PROCEDURE — 87804 INFLUENZA ASSAY W/OPTIC: CPT | Performed by: INTERNAL MEDICINE

## 2017-11-20 PROCEDURE — 82043 UR ALBUMIN QUANTITATIVE: CPT | Performed by: INTERNAL MEDICINE

## 2017-11-20 PROCEDURE — 82570 ASSAY OF URINE CREATININE: CPT | Performed by: INTERNAL MEDICINE

## 2017-11-20 PROCEDURE — 99214 OFFICE O/P EST MOD 30 MIN: CPT | Performed by: INTERNAL MEDICINE

## 2017-11-20 RX ORDER — WARFARIN SODIUM 1 MG/1
TABLET ORAL
Qty: 60 TABLET | Refills: 5 | Status: SHIPPED | OUTPATIENT
Start: 2017-11-20 | End: 2018-05-11 | Stop reason: DRUGHIGH

## 2017-11-20 RX ORDER — NICOTINE 21 MG/24HR
1 PATCH, TRANSDERMAL 24 HOURS TRANSDERMAL EVERY 24 HOURS
Qty: 28 PATCH | Refills: 0 | Status: SHIPPED | OUTPATIENT
Start: 2017-11-20 | End: 2018-05-11

## 2017-11-20 RX ORDER — NICOTINE 21 MG/24HR
1 PATCH, TRANSDERMAL 24 HOURS TRANSDERMAL EVERY 24 HOURS
Qty: 21 PATCH | Refills: 0 | Status: SHIPPED | OUTPATIENT
Start: 2017-11-20 | End: 2018-05-11

## 2017-11-20 RX ORDER — PREDNISONE 20 MG/1
20 TABLET ORAL DAILY
Qty: 10 TABLET | Refills: 0 | Status: SHIPPED | OUTPATIENT
Start: 2017-11-20 | End: 2018-05-11

## 2017-11-20 RX ORDER — LEVOFLOXACIN 500 MG/1
500 TABLET, FILM COATED ORAL DAILY
Qty: 10 TABLET | Refills: 0 | Status: SHIPPED | OUTPATIENT
Start: 2017-11-20 | End: 2018-05-11

## 2017-11-21 LAB
ALBUMIN UR-MCNC: 2.9 MG/L
CREAT UR-MCNC: 96.9 MG/DL
MICROALBUMIN/CREAT UR: 29.9 MG/G (ref 0–30)

## 2017-11-27 ENCOUNTER — LAB (OUTPATIENT)
Dept: LAB | Facility: OTHER | Age: 67
End: 2017-11-27

## 2017-11-27 DIAGNOSIS — R53.83 FATIGUE, UNSPECIFIED TYPE: ICD-10-CM

## 2017-11-27 PROCEDURE — 36415 COLL VENOUS BLD VENIPUNCTURE: CPT | Performed by: INTERNAL MEDICINE

## 2017-11-27 PROCEDURE — 82607 VITAMIN B-12: CPT | Performed by: INTERNAL MEDICINE

## 2017-11-27 PROCEDURE — 82746 ASSAY OF FOLIC ACID SERUM: CPT | Performed by: INTERNAL MEDICINE

## 2017-11-28 LAB
FOLATE SERPL-MCNC: 14.5 NG/ML (ref 2.76–21)
VIT B12 BLD-MCNC: >1000 PG/ML (ref 239–931)

## 2018-01-22 RX ORDER — PAROXETINE HYDROCHLORIDE 20 MG/1
TABLET, FILM COATED ORAL
Qty: 30 TABLET | Refills: 5 | Status: SHIPPED | OUTPATIENT
Start: 2018-01-22 | End: 2018-02-20 | Stop reason: SDUPTHER

## 2018-01-22 RX ORDER — HYDROXYUREA 500 MG/1
CAPSULE ORAL
Qty: 45 CAPSULE | Refills: 5 | Status: SHIPPED | OUTPATIENT
Start: 2018-01-22 | End: 2018-06-13 | Stop reason: SDUPTHER

## 2018-01-22 RX ORDER — MEMANTINE HYDROCHLORIDE 5 MG/1
TABLET ORAL
Qty: 30 TABLET | Refills: 5 | Status: SHIPPED | OUTPATIENT
Start: 2018-01-22 | End: 2018-02-20 | Stop reason: SDUPTHER

## 2018-01-22 RX ORDER — DONEPEZIL HYDROCHLORIDE 10 MG/1
TABLET, FILM COATED ORAL
Qty: 30 TABLET | Refills: 5 | Status: SHIPPED | OUTPATIENT
Start: 2018-01-22 | End: 2018-02-20 | Stop reason: SDUPTHER

## 2018-01-22 RX ORDER — WARFARIN SODIUM 5 MG/1
TABLET ORAL
Qty: 30 TABLET | Refills: 5 | Status: SHIPPED | OUTPATIENT
Start: 2018-01-22 | End: 2018-05-11 | Stop reason: DRUGHIGH

## 2018-01-23 RX ORDER — SIMVASTATIN 40 MG
40 TABLET ORAL DAILY
Qty: 30 TABLET | Refills: 5 | Status: SHIPPED | OUTPATIENT
Start: 2018-01-23 | End: 2018-05-11 | Stop reason: SDUPTHER

## 2018-02-20 RX ORDER — MEMANTINE HYDROCHLORIDE 5 MG/1
5 TABLET ORAL DAILY
Qty: 30 TABLET | Refills: 5 | Status: SHIPPED | OUTPATIENT
Start: 2018-02-20 | End: 2018-06-13 | Stop reason: SDUPTHER

## 2018-02-20 RX ORDER — HYDROXYUREA 500 MG/1
500 CAPSULE ORAL DAILY
Qty: 45 CAPSULE | Refills: 5 | Status: SHIPPED | OUTPATIENT
Start: 2018-02-20 | End: 2018-10-15 | Stop reason: SDUPTHER

## 2018-02-20 RX ORDER — DONEPEZIL HYDROCHLORIDE 10 MG/1
10 TABLET, FILM COATED ORAL DAILY
Qty: 30 TABLET | Refills: 5 | Status: SHIPPED | OUTPATIENT
Start: 2018-02-20 | End: 2018-05-11 | Stop reason: SDUPTHER

## 2018-02-20 RX ORDER — PAROXETINE HYDROCHLORIDE 20 MG/1
20 TABLET, FILM COATED ORAL EVERY MORNING
Qty: 30 TABLET | Refills: 5 | Status: SHIPPED | OUTPATIENT
Start: 2018-02-20 | End: 2018-05-11 | Stop reason: SDUPTHER

## 2018-04-11 ENCOUNTER — LAB (OUTPATIENT)
Dept: LAB | Facility: OTHER | Age: 68
End: 2018-04-11

## 2018-04-11 ENCOUNTER — TRANSCRIBE ORDERS (OUTPATIENT)
Dept: GENERAL RADIOLOGY | Facility: CLINIC | Age: 68
End: 2018-04-11

## 2018-04-11 DIAGNOSIS — E78.5 HYPERLIPIDEMIA, UNSPECIFIED HYPERLIPIDEMIA TYPE: ICD-10-CM

## 2018-04-11 DIAGNOSIS — E11.9 DIABETES MELLITUS WITHOUT COMPLICATION (HCC): ICD-10-CM

## 2018-04-11 DIAGNOSIS — E11.9 DIABETES MELLITUS WITHOUT COMPLICATION (HCC): Primary | ICD-10-CM

## 2018-04-11 LAB
ALBUMIN SERPL-MCNC: 4.1 G/DL (ref 3.2–5.5)
ALBUMIN/GLOB SERPL: 1.2 G/DL (ref 1–3)
ALP SERPL-CCNC: 49 U/L (ref 15–121)
ALT SERPL W P-5'-P-CCNC: 18 U/L (ref 10–60)
ANION GAP SERPL CALCULATED.3IONS-SCNC: 8 MMOL/L (ref 5–15)
AST SERPL-CCNC: 20 U/L (ref 10–60)
BILIRUB SERPL-MCNC: 0.6 MG/DL (ref 0.2–1)
BUN BLD-MCNC: 6 MG/DL (ref 8–25)
BUN/CREAT SERPL: 12 (ref 7–25)
CALCIUM SPEC-SCNC: 9.1 MG/DL (ref 8.4–10.8)
CHLORIDE SERPL-SCNC: 96 MMOL/L (ref 100–112)
CHOLEST SERPL-MCNC: 150 MG/DL (ref 150–200)
CO2 SERPL-SCNC: 32 MMOL/L (ref 20–32)
CREAT BLD-MCNC: 0.5 MG/DL (ref 0.4–1.3)
GFR SERPL CREATININE-BSD FRML MDRD: 122 ML/MIN/1.73 (ref 45–104)
GLOBULIN UR ELPH-MCNC: 3.4 GM/DL (ref 2.5–4.6)
GLUCOSE BLD-MCNC: 187 MG/DL (ref 70–100)
HBA1C MFR BLD: 7.4 % (ref 4–5.6)
HDLC SERPL-MCNC: 54 MG/DL (ref 35–100)
LDLC SERPL CALC-MCNC: 78 MG/DL
LDLC/HDLC SERPL: 1.44 {RATIO}
POTASSIUM BLD-SCNC: 3.9 MMOL/L (ref 3.4–5.4)
PROT SERPL-MCNC: 7.5 G/DL (ref 6.7–8.2)
SODIUM BLD-SCNC: 136 MMOL/L (ref 134–146)
TRIGL SERPL-MCNC: 92 MG/DL (ref 35–160)
VLDLC SERPL-MCNC: 18.4 MG/DL

## 2018-04-11 PROCEDURE — 83036 HEMOGLOBIN GLYCOSYLATED A1C: CPT | Performed by: INTERNAL MEDICINE

## 2018-04-11 PROCEDURE — 80053 COMPREHEN METABOLIC PANEL: CPT | Performed by: INTERNAL MEDICINE

## 2018-04-11 PROCEDURE — 80061 LIPID PANEL: CPT | Performed by: INTERNAL MEDICINE

## 2018-04-11 PROCEDURE — 36415 COLL VENOUS BLD VENIPUNCTURE: CPT | Performed by: INTERNAL MEDICINE

## 2018-05-11 ENCOUNTER — TELEPHONE (OUTPATIENT)
Dept: FAMILY MEDICINE CLINIC | Facility: CLINIC | Age: 68
End: 2018-05-11

## 2018-05-11 ENCOUNTER — LAB (OUTPATIENT)
Dept: LAB | Facility: OTHER | Age: 68
End: 2018-05-11

## 2018-05-11 ENCOUNTER — OFFICE VISIT (OUTPATIENT)
Dept: FAMILY MEDICINE CLINIC | Facility: CLINIC | Age: 68
End: 2018-05-11

## 2018-05-11 VITALS
OXYGEN SATURATION: 98 % | BODY MASS INDEX: 23.55 KG/M2 | DIASTOLIC BLOOD PRESSURE: 76 MMHG | SYSTOLIC BLOOD PRESSURE: 130 MMHG | HEIGHT: 62 IN | TEMPERATURE: 97.4 F | HEART RATE: 94 BPM | WEIGHT: 128 LBS

## 2018-05-11 DIAGNOSIS — I10 ESSENTIAL HYPERTENSION: ICD-10-CM

## 2018-05-11 DIAGNOSIS — E53.8 CYANOCOBALAMIN DEFICIENCY: ICD-10-CM

## 2018-05-11 DIAGNOSIS — F51.01 PRIMARY INSOMNIA: ICD-10-CM

## 2018-05-11 DIAGNOSIS — E11.9 TYPE 2 DIABETES MELLITUS WITHOUT COMPLICATION, WITHOUT LONG-TERM CURRENT USE OF INSULIN (HCC): Primary | ICD-10-CM

## 2018-05-11 DIAGNOSIS — E78.5 HYPERLIPIDEMIA, UNSPECIFIED HYPERLIPIDEMIA TYPE: ICD-10-CM

## 2018-05-11 DIAGNOSIS — J44.9 CHRONIC OBSTRUCTIVE PULMONARY DISEASE, UNSPECIFIED COPD TYPE (HCC): ICD-10-CM

## 2018-05-11 DIAGNOSIS — Z23 NEED FOR SHINGLES VACCINE: ICD-10-CM

## 2018-05-11 DIAGNOSIS — Z79.01 CURRENT USE OF LONG TERM ANTICOAGULATION: ICD-10-CM

## 2018-05-11 DIAGNOSIS — Z76.89 ENCOUNTER TO ESTABLISH CARE WITH NEW DOCTOR: ICD-10-CM

## 2018-05-11 DIAGNOSIS — F17.200 TOBACCO DEPENDENCE SYNDROME: ICD-10-CM

## 2018-05-11 DIAGNOSIS — R41.3 MEMORY IMPAIRMENT: ICD-10-CM

## 2018-05-11 DIAGNOSIS — K21.9 GASTROESOPHAGEAL REFLUX DISEASE, ESOPHAGITIS PRESENCE NOT SPECIFIED: ICD-10-CM

## 2018-05-11 DIAGNOSIS — Z86.711 HISTORY OF PULMONARY EMBOLISM: ICD-10-CM

## 2018-05-11 LAB
INR PPP: 3.38 (ref 0.8–1.2)
PROTHROMBIN TIME: 34.7 SECONDS (ref 11.1–15.3)

## 2018-05-11 PROCEDURE — 85610 PROTHROMBIN TIME: CPT | Performed by: FAMILY MEDICINE

## 2018-05-11 PROCEDURE — 99214 OFFICE O/P EST MOD 30 MIN: CPT | Performed by: FAMILY MEDICINE

## 2018-05-11 PROCEDURE — 36415 COLL VENOUS BLD VENIPUNCTURE: CPT | Performed by: FAMILY MEDICINE

## 2018-05-11 RX ORDER — MEMANTINE HYDROCHLORIDE 5 MG/1
TABLET ORAL
Qty: 90 TABLET | Refills: 1 | Status: SHIPPED | OUTPATIENT
Start: 2018-05-11 | End: 2018-08-13 | Stop reason: SDUPTHER

## 2018-05-11 RX ORDER — BLOOD-GLUCOSE METER
KIT MISCELLANEOUS
Qty: 1 EACH | Refills: 0 | Status: SHIPPED | OUTPATIENT
Start: 2018-05-11

## 2018-05-11 RX ORDER — WARFARIN SODIUM 1 MG/1
2 TABLET ORAL DAILY
Qty: 60 TABLET | Refills: 5 | Status: CANCELLED | OUTPATIENT
Start: 2018-05-11

## 2018-05-11 RX ORDER — HYDROXYUREA 500 MG/1
500 CAPSULE ORAL DAILY
Qty: 45 CAPSULE | Refills: 5 | Status: CANCELLED | OUTPATIENT
Start: 2018-05-11

## 2018-05-11 RX ORDER — SIMVASTATIN 40 MG
40 TABLET ORAL DAILY
Qty: 90 TABLET | Refills: 1 | Status: SHIPPED | OUTPATIENT
Start: 2018-05-11 | End: 2018-08-13 | Stop reason: SDUPTHER

## 2018-05-11 RX ORDER — PAROXETINE HYDROCHLORIDE 20 MG/1
20 TABLET, FILM COATED ORAL EVERY MORNING
Qty: 90 TABLET | Refills: 1 | Status: SHIPPED | OUTPATIENT
Start: 2018-05-11 | End: 2018-06-13 | Stop reason: SDUPTHER

## 2018-05-11 RX ORDER — ZOLPIDEM TARTRATE 10 MG/1
10 TABLET ORAL NIGHTLY PRN
Qty: 90 TABLET | Refills: 0 | Status: SHIPPED | OUTPATIENT
Start: 2018-05-11 | End: 2018-08-13 | Stop reason: SDUPTHER

## 2018-05-11 RX ORDER — VALSARTAN 80 MG/1
80 TABLET ORAL DAILY
Qty: 90 TABLET | Refills: 1 | Status: SHIPPED | OUTPATIENT
Start: 2018-05-11 | End: 2018-07-18 | Stop reason: ALTCHOICE

## 2018-05-11 RX ORDER — LEVOFLOXACIN 500 MG/1
500 TABLET, FILM COATED ORAL DAILY
Qty: 10 TABLET | Refills: 0 | Status: CANCELLED | OUTPATIENT
Start: 2018-05-11

## 2018-05-11 RX ORDER — LANCETS 30 GAUGE
EACH MISCELLANEOUS
Qty: 100 EACH | Refills: 12 | Status: SHIPPED | OUTPATIENT
Start: 2018-05-11

## 2018-05-11 RX ORDER — DONEPEZIL HYDROCHLORIDE 10 MG/1
10 TABLET, FILM COATED ORAL DAILY
Qty: 90 TABLET | Refills: 1 | Status: SHIPPED | OUTPATIENT
Start: 2018-05-11 | End: 2018-08-13 | Stop reason: SDUPTHER

## 2018-05-11 RX ORDER — WARFARIN SODIUM 5 MG/1
5 TABLET ORAL DAILY
Qty: 30 TABLET | Refills: 5 | Status: CANCELLED | OUTPATIENT
Start: 2018-05-11

## 2018-05-11 RX ORDER — PANTOPRAZOLE SODIUM 40 MG/1
40 TABLET, DELAYED RELEASE ORAL DAILY
Qty: 90 TABLET | Refills: 1 | Status: SHIPPED | OUTPATIENT
Start: 2018-05-11 | End: 2019-02-25 | Stop reason: SDUPTHER

## 2018-05-11 RX ORDER — BUPROPION HYDROCHLORIDE 150 MG/1
150 TABLET ORAL DAILY
Qty: 90 TABLET | Refills: 1 | Status: SHIPPED | OUTPATIENT
Start: 2018-05-11 | End: 2018-11-16 | Stop reason: SDUPTHER

## 2018-05-11 RX ORDER — WARFARIN SODIUM 5 MG/1
TABLET ORAL
Qty: 30 TABLET | Refills: 5 | Status: SHIPPED | OUTPATIENT
Start: 2018-05-11 | End: 2018-08-13 | Stop reason: SDUPTHER

## 2018-05-11 NOTE — PROGRESS NOTES
Subjective   Chief Complaint   Patient presents with   • Establish Care     Prev seen      Nona Hernandez is a 67 y.o. female.   Establish Care (Prev seen )    History of Present Illness     [facility] - copd, history of PE, hyperlipidemia, depression, diabetes, hypertension, memory impairment, insomnia, tobacco dependence, gerd    Copd - managed with advair, proair, spiriva inhalers    History of PE - managed with coumadin  Last INR checked by previous provider  Last checked in December  Current dose of coumadin 6mg nightly    Depression - managed with paxil and wellbutrin    Hyperlipidemia - managed with zocor    Diabetes - managed with metformin  Followed by Dr Nuno   Cataract surgery done in [place]  Due for diabetic eye exam  Due for a diabetic foot exam    Hypertension - managed with diovan    b12 deficiency - with injections, managed every month    Memory impairment -  managed with aricept and namenda    Insomnia - managed with ambien    Tobacco dependence - currently smoking 1ppd    gerd - managed with protonix    The following portions of the patient's history were reviewed and updated as appropriate: allergies, current medications, past family history, past medical history, past social history, past surgical history and problem list.    Review of Systems   Constitutional: Negative for appetite change, chills, fatigue and fever.   HENT: Negative for congestion, ear pain, rhinorrhea and sore throat.    Eyes: Negative for pain.   Respiratory: Negative for cough and shortness of breath.    Cardiovascular: Negative for chest pain and palpitations.   Gastrointestinal: Negative for abdominal pain, constipation, diarrhea and nausea.   Genitourinary: Negative for dysuria.   Musculoskeletal: Negative for back pain, joint swelling and neck pain.   Skin: Negative for rash.   Neurological: Negative for dizziness and headaches.   Psychiatric/Behavioral: Positive for sleep disturbance.       Objective   /76   Pulse 94   " Temp 97.4 °F (36.3 °C)   Ht 157.5 cm (62.01\")   Wt 58.1 kg (128 lb)   SpO2 98%   BMI 23.41 kg/m²   Physical Exam   Constitutional: She is oriented to person, place, and time. She appears well-developed and well-nourished.   HENT:   Head: Normocephalic and atraumatic.   Eyes: Pupils are equal, round, and reactive to light.   Neck: Normal range of motion. Neck supple.   Cardiovascular: Normal rate, regular rhythm and normal heart sounds.    Pulmonary/Chest: No respiratory distress. She has decreased breath sounds. She has wheezes. She has no rales.   Abdominal: Soft. Bowel sounds are normal.   Musculoskeletal: Normal range of motion.   Neurological: She is alert and oriented to person, place, and time.   Skin: Skin is warm and dry.   Psychiatric: She has a normal mood and affect.   Nursing note and vitals reviewed.      Assessment/Plan   Problems Addressed this Visit        Cardiovascular and Mediastinum    Essential hypertension    Relevant Medications    valsartan (DIOVAN) 80 MG tablet    Hyperlipidemia    Relevant Medications    simvastatin (ZOCOR) 40 MG tablet       Respiratory    Chronic obstructive pulmonary disease    Relevant Medications    tiotropium (SPIRIVA HANDIHALER) 18 MCG per inhalation capsule       Digestive    Cyanocobalamin deficiency    Gastroesophageal reflux disease    Relevant Medications    pantoprazole (PROTONIX) 40 MG EC tablet       Endocrine    Type 2 diabetes mellitus without complication, without long-term current use of insulin - Primary    Relevant Medications    metFORMIN (GLUCOPHAGE) 500 MG tablet    glucose monitor monitoring kit    Lancets misc    glucose blood test strip       Other    Current use of long term anticoagulation    Relevant Orders    Protime-INR    Memory impairment    History of pulmonary embolism      Other Visit Diagnoses     Encounter to establish care with new doctor        Tobacco dependence syndrome        Primary insomnia        Need for shingles vaccine "            Refilled medications  Changed to 90 day supply    Refilled ambien  The patient has read and signed the [facility] Controlled Substance Contract.  I will continue to see patient for regular follow up appointments.  They are well controlled on their medication.  LUCINDA is updated every 3 months. The patient is aware of the potential for addiction and dependence.    INR ordered    Ordered glucometer, test strips and lancets    Patient's Body mass index is 23.41 kg/m². BMI is within normal parameters. No follow-up required.    I advised the patient of the risks in continuing to use tobacco, and I provided this patient with smoking cessation educational materials.    During this visit, I spent <3 minutes counseling the patient regarding smoking cessation.    Need for shingles vaccine - script provided    Recheck in 4 weeks for a diabetic foot exam

## 2018-05-11 NOTE — TELEPHONE ENCOUNTER
Called pt and told her of results. Pt stated her understanding. I adjusted her wafarin to 5 mg on her medicine list.

## 2018-05-11 NOTE — TELEPHONE ENCOUNTER
----- Message from Raya Velasquez MD sent at 5/11/2018  2:17 PM CDT -----  Coumadin is too high - decrease her level to 5mg tablet and recheck in 4 weeks as ordered.  Update her medication list.

## 2018-06-13 ENCOUNTER — OFFICE VISIT (OUTPATIENT)
Dept: FAMILY MEDICINE CLINIC | Facility: CLINIC | Age: 68
End: 2018-06-13

## 2018-06-13 ENCOUNTER — TELEPHONE (OUTPATIENT)
Dept: FAMILY MEDICINE CLINIC | Facility: CLINIC | Age: 68
End: 2018-06-13

## 2018-06-13 ENCOUNTER — LAB (OUTPATIENT)
Dept: LAB | Facility: OTHER | Age: 68
End: 2018-06-13

## 2018-06-13 VITALS
SYSTOLIC BLOOD PRESSURE: 115 MMHG | BODY MASS INDEX: 24.07 KG/M2 | TEMPERATURE: 98.1 F | DIASTOLIC BLOOD PRESSURE: 64 MMHG | OXYGEN SATURATION: 94 % | WEIGHT: 130.8 LBS | HEART RATE: 84 BPM | HEIGHT: 62 IN

## 2018-06-13 DIAGNOSIS — I10 ESSENTIAL HYPERTENSION: ICD-10-CM

## 2018-06-13 DIAGNOSIS — Z23 NEED FOR PNEUMOCOCCAL VACCINE: ICD-10-CM

## 2018-06-13 DIAGNOSIS — Z23 NEED FOR SHINGLES VACCINE: ICD-10-CM

## 2018-06-13 DIAGNOSIS — Z79.01 CURRENT USE OF LONG TERM ANTICOAGULATION: ICD-10-CM

## 2018-06-13 DIAGNOSIS — F17.200 TOBACCO DEPENDENCE SYNDROME: ICD-10-CM

## 2018-06-13 DIAGNOSIS — Z86.711 HISTORY OF PULMONARY EMBOLISM: ICD-10-CM

## 2018-06-13 DIAGNOSIS — E11.9 TYPE 2 DIABETES MELLITUS WITHOUT COMPLICATION, WITHOUT LONG-TERM CURRENT USE OF INSULIN (HCC): ICD-10-CM

## 2018-06-13 DIAGNOSIS — E11.9 ENCOUNTER FOR COMPREHENSIVE DIABETIC FOOT EXAMINATION, TYPE 2 DIABETES MELLITUS (HCC): Primary | ICD-10-CM

## 2018-06-13 DIAGNOSIS — F33.1 MODERATE EPISODE OF RECURRENT MAJOR DEPRESSIVE DISORDER (HCC): ICD-10-CM

## 2018-06-13 LAB
INR PPP: 2.33 (ref 0.8–1.2)
PROTHROMBIN TIME: 24.5 SECONDS (ref 11.1–15.3)

## 2018-06-13 PROCEDURE — 90732 PPSV23 VACC 2 YRS+ SUBQ/IM: CPT | Performed by: FAMILY MEDICINE

## 2018-06-13 PROCEDURE — 90471 IMMUNIZATION ADMIN: CPT | Performed by: FAMILY MEDICINE

## 2018-06-13 PROCEDURE — 85610 PROTHROMBIN TIME: CPT | Performed by: FAMILY MEDICINE

## 2018-06-13 PROCEDURE — 36415 COLL VENOUS BLD VENIPUNCTURE: CPT | Performed by: FAMILY MEDICINE

## 2018-06-13 PROCEDURE — 99214 OFFICE O/P EST MOD 30 MIN: CPT | Performed by: FAMILY MEDICINE

## 2018-06-13 RX ORDER — PAROXETINE HYDROCHLORIDE 40 MG/1
40 TABLET, FILM COATED ORAL EVERY MORNING
Qty: 30 TABLET | Refills: 1 | Status: SHIPPED | OUTPATIENT
Start: 2018-06-13 | End: 2018-07-16 | Stop reason: SDUPTHER

## 2018-06-13 NOTE — PROGRESS NOTES
Subjective   Chief Complaint   Patient presents with   • Diabetes     diabetic foot exam   • Med Refill     Paxil not working     Nona Hernandez is a 67 y.o. female.   Diabetes (diabetic foot exam) and Med Refill (Paxil not working)    History of Present Illness     cmh - copd, history of PE, hyperlipidemia, depression, diabetes, hypertension, memory impairment, insomnia, tobacco dependence, gerd    Copd - managed with advair, proair, spiriva inhalers    History of PE - managed with coumadin  Last INR 3.38  Due to repeat INR today  Current dose of coumadin 5mg nightly    Depression - managed with paxil and wellbutrin  Symptoms rated 7/10  Asking to adjust her paxil    Diabetes - managed with metformin  Followed by Dr Nuno   Cataract surgery done in [place]  Due for diabetic eye exam  Due for a diabetic foot exam today  Hyperlipidemia - managed with zocor  b12 deficiency - with injections, managed every month    Hypertension - managed with diovan    Insomnia - managed with ambien    Tobacco dependence - currently smoking 1ppd    The following portions of the patient's history were reviewed and updated as appropriate: allergies, current medications, past family history, past medical history, past social history, past surgical history and problem list.    Review of Systems   Constitutional: Negative for appetite change, chills, fatigue and fever.   HENT: Negative for congestion, ear pain, rhinorrhea and sore throat.    Eyes: Negative for pain.   Respiratory: Negative for cough and shortness of breath.    Cardiovascular: Negative for chest pain and palpitations.   Gastrointestinal: Negative for abdominal pain, constipation, diarrhea and nausea.   Genitourinary: Negative for dysuria.   Musculoskeletal: Negative for back pain, joint swelling and neck pain.   Skin: Negative for rash.   Neurological: Negative for dizziness and headaches.   Psychiatric/Behavioral: Positive for decreased concentration and dysphoric mood.  "      Objective   /64   Pulse 84   Temp 98.1 °F (36.7 °C)   Ht 157.5 cm (62.01\")   Wt 59.3 kg (130 lb 12.8 oz)   SpO2 94%   BMI 23.92 kg/m²   Physical Exam   Constitutional: She is oriented to person, place, and time. She appears well-developed and well-nourished.   HENT:   Head: Normocephalic and atraumatic.   Eyes: Pupils are equal, round, and reactive to light.   Neck: Normal range of motion. Neck supple.   Cardiovascular: Normal rate, regular rhythm and normal heart sounds.    Pulmonary/Chest: Effort normal. No respiratory distress. She has wheezes. She has no rales.   Abdominal: Soft. Bowel sounds are normal.   Musculoskeletal: Normal range of motion.    Nona had a diabetic foot exam performed today.   During the foot exam she had a monofilament test performed (10/10 bilateral).    Neurological Sensory Findings -  Unaltered sharp/dull right ankle/foot discrimination and unaltered sharp/dull left ankle/foot discrimination. No right ankle/foot altered proprioception and no left ankle/foot altered proprioception  Vascular Status -  Her right foot exhibits normal foot vasculature  and no edema. Her left foot exhibits normal foot vasculature  and no edema.  Skin Integrity  -  Her right foot skin is intact.  She has no right foot onychomycosis, no right foot ulcer, non-callous right foot, no ingrown toenail on right foot, right heel is not dry and cracked, no right foot warmth, no right foot blister and no right foot gangrenous changes.Her left foot skin is intact. She has no left foot onychomycosis, no left foot ulcer, non-callous left foot, no left ingrown toenail, no left heel dry and cracked, no left foot warmth, no left foot blister and no left foot gangrenous changes..  Neurological: She is alert and oriented to person, place, and time. She has normal reflexes.   Skin: Skin is warm and dry.   Psychiatric: She exhibits a depressed mood.   Nursing note and vitals reviewed.      Assessment/Plan "   Problems Addressed this Visit        Cardiovascular and Mediastinum    Essential hypertension       Endocrine    Type 2 diabetes mellitus without complication, without long-term current use of insulin       Other    Current use of long term anticoagulation    History of pulmonary embolism    Tobacco dependence syndrome    Moderate episode of recurrent major depressive disorder    Relevant Medications    PARoxetine (PAXIL) 40 MG tablet      Other Visit Diagnoses     Encounter for comprehensive diabetic foot examination, type 2 diabetes mellitus    -  Primary    Need for pneumococcal vaccine        Relevant Orders    Pneumococcal Polysaccharide Vaccine 23-Valent (PPSV23) Greater Than or Equal To 1yo Subcutaneous / IM (Completed)    Need for shingles vaccine            Pneumococcal vaccine today    Script provided for shingles vaccine    Diabetic foot exam done today    Scheduled for diabetic eye exam - [facility]    INR ordered    Adjusted paxil  Patient to call if symptoms are no better in 2 weeks to adjust wellbutrin until next appointment    I advised Nona of the risks of continuing to use tobacco, and I provided her with tobacco cessation educational materials in the After Visit Summary.     During this visit, I spent <3 minutes counseling the patient regarding tobacco cessation.    Patient's Body mass index is 23.92 kg/m². BMI is within normal parameters. No follow-up required.    Recheck in 4 weeks

## 2018-06-13 NOTE — TELEPHONE ENCOUNTER
----- Message from Raya Velasquez MD sent at 6/13/2018  3:49 PM CDT -----  Continue current dose of coumadin - INR is therapeutic

## 2018-06-14 ENCOUNTER — TELEPHONE (OUTPATIENT)
Dept: FAMILY MEDICINE CLINIC | Facility: CLINIC | Age: 68
End: 2018-06-14

## 2018-07-16 ENCOUNTER — LAB (OUTPATIENT)
Dept: LAB | Facility: OTHER | Age: 68
End: 2018-07-16

## 2018-07-16 ENCOUNTER — OFFICE VISIT (OUTPATIENT)
Dept: FAMILY MEDICINE CLINIC | Facility: CLINIC | Age: 68
End: 2018-07-16

## 2018-07-16 VITALS
DIASTOLIC BLOOD PRESSURE: 64 MMHG | SYSTOLIC BLOOD PRESSURE: 120 MMHG | TEMPERATURE: 98.3 F | HEART RATE: 87 BPM | WEIGHT: 128 LBS | HEIGHT: 62 IN | OXYGEN SATURATION: 95 % | BODY MASS INDEX: 23.55 KG/M2

## 2018-07-16 DIAGNOSIS — Z79.01 CURRENT USE OF LONG TERM ANTICOAGULATION: ICD-10-CM

## 2018-07-16 DIAGNOSIS — I10 ESSENTIAL HYPERTENSION: ICD-10-CM

## 2018-07-16 DIAGNOSIS — F17.200 TOBACCO DEPENDENCE SYNDROME: ICD-10-CM

## 2018-07-16 DIAGNOSIS — J44.9 CHRONIC OBSTRUCTIVE PULMONARY DISEASE, UNSPECIFIED COPD TYPE (HCC): ICD-10-CM

## 2018-07-16 DIAGNOSIS — Z12.2 ENCOUNTER FOR SCREENING FOR LUNG CANCER: ICD-10-CM

## 2018-07-16 DIAGNOSIS — F33.1 MODERATE EPISODE OF RECURRENT MAJOR DEPRESSIVE DISORDER (HCC): Primary | ICD-10-CM

## 2018-07-16 LAB
INR PPP: 2.36 (ref 0.8–1.2)
PROTHROMBIN TIME: 24.8 SECONDS (ref 11.1–15.3)

## 2018-07-16 PROCEDURE — 99214 OFFICE O/P EST MOD 30 MIN: CPT | Performed by: FAMILY MEDICINE

## 2018-07-16 PROCEDURE — 85610 PROTHROMBIN TIME: CPT | Performed by: FAMILY MEDICINE

## 2018-07-16 PROCEDURE — 36415 COLL VENOUS BLD VENIPUNCTURE: CPT | Performed by: FAMILY MEDICINE

## 2018-07-16 RX ORDER — PAROXETINE HYDROCHLORIDE 40 MG/1
40 TABLET, FILM COATED ORAL EVERY MORNING
Qty: 30 TABLET | Refills: 2 | Status: SHIPPED | OUTPATIENT
Start: 2018-07-16 | End: 2018-10-15 | Stop reason: SDUPTHER

## 2018-07-16 NOTE — PROGRESS NOTES
Subjective   Chief Complaint   Patient presents with   • Depression     4 weeks   • been having chills     Nona Hernandez is a 67 y.o. female.   Depression (4 weeks) and been having chills    History of Present Illness     cmh - copd, history of PE, hyperlipidemia, depression, diabetes, hypertension, memory impairment, insomnia, tobacco dependence, gerd    Copd - managed with advair, proair, spiriva inhalers    History of PE - managed with coumadin  Last INR 2.33  Due to repeat INR today  Current dose of coumadin 5mg nightly    Depression - managed with paxil and wellbutrin  Last office visit she was restarted on wellbutrin and her paxil was adjusted however her  believes that she was not provided with the new dose of paxil  Symptoms rated 7/10    Diabetes - managed with metformin  Followed by Dr Nuno   Cataract surgery done in [place]  Due for diabetic eye exam - scheduled  Diabetic foot exam is current  Hyperlipidemia - managed with zocor  b12 deficiency - with injections, managed every month    Hypertension - managed with diovan    Tobacco dependence - currently smoking 1ppd    History of pulmonary nodules - due for repeat screening    The following portions of the patient's history were reviewed and updated as appropriate: allergies, current medications, past family history, past medical history, past social history, past surgical history and problem list.    Review of Systems   Constitutional: Negative for appetite change, chills, fatigue and fever.   HENT: Negative for congestion, ear pain, rhinorrhea and sore throat.    Eyes: Negative for pain.   Respiratory: Positive for wheezing. Negative for cough and shortness of breath.    Cardiovascular: Negative for chest pain and palpitations.   Gastrointestinal: Negative for abdominal pain, constipation, diarrhea and nausea.   Genitourinary: Negative for dysuria.   Musculoskeletal: Negative for back pain, joint swelling and neck pain.   Skin: Negative for  "rash.   Neurological: Negative for dizziness and headaches.   Psychiatric/Behavioral: Positive for decreased concentration and dysphoric mood.       Objective   /64   Pulse 87   Temp 98.3 °F (36.8 °C)   Ht 157.5 cm (62.01\")   Wt 58.1 kg (128 lb)   SpO2 95%   BMI 23.41 kg/m²   Physical Exam   Constitutional: She is oriented to person, place, and time. She appears well-developed and well-nourished.   HENT:   Head: Normocephalic and atraumatic.   Eyes: Pupils are equal, round, and reactive to light.   Neck: Normal range of motion. Neck supple.   Cardiovascular: Normal rate, regular rhythm and normal heart sounds.    Pulmonary/Chest: No respiratory distress. She has wheezes. She has no rales.   Abdominal: Soft. Bowel sounds are normal.   Musculoskeletal: Normal range of motion.   Neurological: She is alert and oriented to person, place, and time.   Skin: Skin is warm and dry.   Psychiatric: She exhibits a depressed mood.   Nursing note and vitals reviewed.      Assessment/Plan   Problems Addressed this Visit        Cardiovascular and Mediastinum    Essential hypertension       Respiratory    Chronic obstructive pulmonary disease (CMS/HCC)       Other    Current use of long term anticoagulation    Tobacco dependence syndrome    Moderate episode of recurrent major depressive disorder (CMS/HCC) - Primary    Relevant Medications    PARoxetine (PAXIL) 40 MG tablet      Other Visit Diagnoses     Encounter for screening for lung cancer        Relevant Orders    CT Chest Low Dose Wo        Adjusted paxil - called pharmacy to void out previous prescription and fill 40 mg   Continue with wellbutrin    Low dose CT chest ordered    Diabetic eye exam is scheduled    Patient's Body mass index is 23.41 kg/m². BMI is within normal parameters. No follow-up required.    I advised Nona of the risks of continuing to use tobacco, and I provided her with tobacco cessation educational materials in the After Visit Summary. "     During this visit, I spent <3 minutes counseling the patient regarding tobacco cessation.    Ordered INR today    Recheck in 4 weeks

## 2018-07-18 ENCOUNTER — TELEPHONE (OUTPATIENT)
Dept: FAMILY MEDICINE CLINIC | Facility: CLINIC | Age: 68
End: 2018-07-18

## 2018-07-18 RX ORDER — LOSARTAN POTASSIUM 50 MG/1
50 TABLET ORAL DAILY
Qty: 30 TABLET | Refills: 3 | Status: SHIPPED | OUTPATIENT
Start: 2018-07-18 | End: 2018-08-13 | Stop reason: SDUPTHER

## 2018-07-18 NOTE — TELEPHONE ENCOUNTER
----- Message from Raya Velasquez MD sent at 7/16/2018  2:51 PM CDT -----  INR therapeutic - continue current dose of coumadin

## 2018-07-20 RX ORDER — LOSARTAN POTASSIUM 50 MG/1
50 TABLET ORAL DAILY
Qty: 90 TABLET | Refills: 1 | Status: SHIPPED | OUTPATIENT
Start: 2018-07-20 | End: 2019-01-14 | Stop reason: SDUPTHER

## 2018-08-01 ENCOUNTER — TELEPHONE (OUTPATIENT)
Dept: FAMILY MEDICINE CLINIC | Facility: CLINIC | Age: 68
End: 2018-08-01

## 2018-08-01 NOTE — TELEPHONE ENCOUNTER
----- Message from Raya Velasquez MD sent at 7/31/2018  9:07 PM CDT -----  Right sided pulmonary module noted recommend repeat in 6 months

## 2018-08-13 ENCOUNTER — TELEPHONE (OUTPATIENT)
Dept: FAMILY MEDICINE CLINIC | Facility: CLINIC | Age: 68
End: 2018-08-13

## 2018-08-13 ENCOUNTER — OFFICE VISIT (OUTPATIENT)
Dept: FAMILY MEDICINE CLINIC | Facility: CLINIC | Age: 68
End: 2018-08-13

## 2018-08-13 ENCOUNTER — LAB (OUTPATIENT)
Dept: LAB | Facility: OTHER | Age: 68
End: 2018-08-13

## 2018-08-13 VITALS
SYSTOLIC BLOOD PRESSURE: 126 MMHG | OXYGEN SATURATION: 93 % | WEIGHT: 129.6 LBS | DIASTOLIC BLOOD PRESSURE: 70 MMHG | BODY MASS INDEX: 23.85 KG/M2 | HEART RATE: 58 BPM | HEIGHT: 62 IN

## 2018-08-13 DIAGNOSIS — Z79.01 CURRENT USE OF LONG TERM ANTICOAGULATION: ICD-10-CM

## 2018-08-13 DIAGNOSIS — F17.201 TOBACCO DEPENDENCE IN REMISSION: ICD-10-CM

## 2018-08-13 DIAGNOSIS — E11.9 TYPE 2 DIABETES MELLITUS WITHOUT COMPLICATION, WITHOUT LONG-TERM CURRENT USE OF INSULIN (HCC): ICD-10-CM

## 2018-08-13 DIAGNOSIS — R00.1 SINUS BRADYCARDIA: ICD-10-CM

## 2018-08-13 DIAGNOSIS — F33.42 RECURRENT MAJOR DEPRESSIVE DISORDER, IN FULL REMISSION (HCC): Primary | ICD-10-CM

## 2018-08-13 DIAGNOSIS — I10 ESSENTIAL HYPERTENSION: ICD-10-CM

## 2018-08-13 DIAGNOSIS — R91.1 PULMONARY NODULE: ICD-10-CM

## 2018-08-13 LAB
INR PPP: 2.1 (ref 0.8–1.2)
PROTHROMBIN TIME: 22.7 SECONDS (ref 11.1–15.3)

## 2018-08-13 PROCEDURE — 99214 OFFICE O/P EST MOD 30 MIN: CPT | Performed by: FAMILY MEDICINE

## 2018-08-13 PROCEDURE — 36415 COLL VENOUS BLD VENIPUNCTURE: CPT | Performed by: FAMILY MEDICINE

## 2018-08-13 PROCEDURE — 85610 PROTHROMBIN TIME: CPT | Performed by: FAMILY MEDICINE

## 2018-08-13 RX ORDER — MEMANTINE HYDROCHLORIDE 5 MG/1
TABLET ORAL
Qty: 90 TABLET | Refills: 1 | Status: SHIPPED | OUTPATIENT
Start: 2018-08-13 | End: 2019-01-14 | Stop reason: SDUPTHER

## 2018-08-13 RX ORDER — DONEPEZIL HYDROCHLORIDE 10 MG/1
10 TABLET, FILM COATED ORAL DAILY
Qty: 90 TABLET | Refills: 1 | Status: SHIPPED | OUTPATIENT
Start: 2018-08-13 | End: 2019-01-14 | Stop reason: SDUPTHER

## 2018-08-13 RX ORDER — WARFARIN SODIUM 5 MG/1
TABLET ORAL
Qty: 90 TABLET | Refills: 0 | Status: SHIPPED | OUTPATIENT
Start: 2018-08-13 | End: 2018-12-10 | Stop reason: SDUPTHER

## 2018-08-13 RX ORDER — ZOLPIDEM TARTRATE 10 MG/1
10 TABLET ORAL NIGHTLY PRN
Qty: 90 TABLET | Refills: 0 | Status: SHIPPED | OUTPATIENT
Start: 2018-08-13

## 2018-08-13 RX ORDER — SIMVASTATIN 40 MG
40 TABLET ORAL DAILY
Qty: 90 TABLET | Refills: 1 | Status: SHIPPED | OUTPATIENT
Start: 2018-08-13 | End: 2019-01-14 | Stop reason: SDUPTHER

## 2018-08-13 NOTE — TELEPHONE ENCOUNTER
----- Message from Raya Velasquez MD sent at 8/13/2018  4:19 PM CDT -----  INR therapeutic - continue with coumadin 5mg nightly.   I have personally performed a face to face diagnostic evaluation on this patient. I have reviewed the ACP note and agree with the history, exam and plan of care, except as noted.

## 2018-08-13 NOTE — PROGRESS NOTES
Subjective   Chief Complaint   Patient presents with   • Follow-up     4 week    • Depression   • Med Refill     Ambien refill     Nona Hernandez is a 68 y.o. female.   Follow-up (4 week ); Depression; and Med Refill (Ambien refill)    History of Present Illness     cmh - copd, history of PE, hyperlipidemia, depression, diabetes, hypertension, memory impairment, insomnia, tobacco dependence, gerd    Copd - managed with advair, proair, spiriva inhalers  Has remained in remission of tobacco use    History of PE - managed with coumadin  Last INR 2.36  Due to repeat INR today  Current dose of coumadin 5mg nightly    Depression - managed with paxil and wellbutrin  Symptoms rated 7/10 last visit  Today she rates her symptoms a 0/10    Diabetes - managed with metformin  Followed by Dr Nuno   Cataract surgery done in [facility] for diabetic eye exam - scheduled  Diabetic foot exam is current  Hyperlipidemia - managed with zocor  b12 deficiency - with injections, managed every month    Hypertension - managed with diovan  This has been changed due to recall to cozaar    Tobacco dependence - currently smoking 1ppd    Insomnia - uses ambien PRN  Due for a refill    History of pulmonary nodules - repeat screening indicated 0.6mm nodule in the right upper lobe - to be rechecked in 6 months in January 2019    The following portions of the patient's history were reviewed and updated as appropriate: allergies, current medications, past family history, past medical history, past social history, past surgical history and problem list.    Review of Systems   Constitutional: Negative for appetite change, chills, fatigue and fever.   HENT: Positive for rhinorrhea. Negative for congestion, ear pain and sore throat.    Eyes: Negative for pain.   Respiratory: Positive for shortness of breath and wheezing. Negative for cough.    Cardiovascular: Negative for chest pain and palpitations.   Gastrointestinal: Negative for abdominal pain,  "constipation, diarrhea and nausea.   Genitourinary: Negative for dysuria.   Musculoskeletal: Negative for back pain, joint swelling and neck pain.   Skin: Negative for rash.   Neurological: Negative for dizziness and headaches.       Objective   /70   Pulse 58   Ht 157.5 cm (62.01\")   Wt 58.8 kg (129 lb 9.6 oz)   SpO2 93%   BMI 23.70 kg/m²   Physical Exam   Constitutional: She is oriented to person, place, and time. She appears well-developed and well-nourished.   HENT:   Head: Normocephalic and atraumatic.   Eyes: Pupils are equal, round, and reactive to light.   Neck: Normal range of motion. Neck supple.   Cardiovascular: Normal rate, regular rhythm and normal heart sounds.    Pulmonary/Chest: Effort normal. No respiratory distress. She has wheezes. She has no rales.   Abdominal: Soft. Bowel sounds are normal.   Musculoskeletal: Normal range of motion.   Neurological: She is alert and oriented to person, place, and time.   Skin: Skin is warm and dry.   Psychiatric: She has a normal mood and affect.   Nursing note and vitals reviewed.      Assessment/Plan   Problems Addressed this Visit        Cardiovascular and Mediastinum    Essential hypertension    Sinus bradycardia       Respiratory    Pulmonary nodule       Endocrine    Type 2 diabetes mellitus without complication, without long-term current use of insulin (CMS/[facility])    Relevant Medications    metFORMIN (GLUCOPHAGE) 500 MG tablet       Other    Current use of long term anticoagulation    Tobacco dependence in remission    Recurrent major depressive disorder, in full remission (CMS/[facility]) - Primary    Relevant Medications    memantine (NAMENDA) 5 MG tablet    zolpidem (AMBIEN) 10 MG tablet    donepezil (ARICEPT) 10 MG tablet        [facility] [place] for diabetic eye exam    Refilled medications  Changed to 90 day supply    The patient has read and signed the [facility] Controlled Substance Contract.  I will continue to see patient for " regular follow up appointments.  They are well controlled on their medication.  LUCINDA is updated every 3 months. The patient is aware of the potential for addiction and dependence.  Refilled spencer mcclure reviewed [id number]    INR to be checked today    Monitor HR and BP    Reviewed LDCT screening test  Repeat in 6 months    Recheck in 4 weeks

## 2018-08-15 ENCOUNTER — TELEPHONE (OUTPATIENT)
Dept: FAMILY MEDICINE CLINIC | Facility: CLINIC | Age: 68
End: 2018-08-15

## 2018-08-15 NOTE — TELEPHONE ENCOUNTER
----- Message from Raya Velasquez MD sent at 8/13/2018  4:19 PM CDT -----  INR therapeutic - continue with coumadin 5mg nightly.

## 2018-09-12 ENCOUNTER — LAB (OUTPATIENT)
Dept: LAB | Facility: OTHER | Age: 68
End: 2018-09-12

## 2018-09-12 ENCOUNTER — OFFICE VISIT (OUTPATIENT)
Dept: FAMILY MEDICINE CLINIC | Facility: CLINIC | Age: 68
End: 2018-09-12

## 2018-09-12 ENCOUNTER — TELEPHONE (OUTPATIENT)
Dept: FAMILY MEDICINE CLINIC | Facility: CLINIC | Age: 68
End: 2018-09-12

## 2018-09-12 VITALS
WEIGHT: 129.3 LBS | BODY MASS INDEX: 23.79 KG/M2 | HEART RATE: 102 BPM | TEMPERATURE: 97.9 F | OXYGEN SATURATION: 92 % | HEIGHT: 62 IN | SYSTOLIC BLOOD PRESSURE: 120 MMHG | DIASTOLIC BLOOD PRESSURE: 64 MMHG

## 2018-09-12 DIAGNOSIS — J44.9 CHRONIC OBSTRUCTIVE PULMONARY DISEASE, UNSPECIFIED COPD TYPE (HCC): ICD-10-CM

## 2018-09-12 DIAGNOSIS — Z79.01 CURRENT USE OF LONG TERM ANTICOAGULATION: ICD-10-CM

## 2018-09-12 DIAGNOSIS — E11.9 TYPE 2 DIABETES MELLITUS WITHOUT COMPLICATION, WITHOUT LONG-TERM CURRENT USE OF INSULIN (HCC): Primary | ICD-10-CM

## 2018-09-12 DIAGNOSIS — I10 ESSENTIAL HYPERTENSION: ICD-10-CM

## 2018-09-12 DIAGNOSIS — F17.200 TOBACCO DEPENDENCE SYNDROME: ICD-10-CM

## 2018-09-12 LAB
INR PPP: 2.12 (ref 0.8–1.2)
PROTHROMBIN TIME: 22.8 SECONDS (ref 11.1–15.3)

## 2018-09-12 PROCEDURE — 85610 PROTHROMBIN TIME: CPT | Performed by: FAMILY MEDICINE

## 2018-09-12 PROCEDURE — 36415 COLL VENOUS BLD VENIPUNCTURE: CPT | Performed by: FAMILY MEDICINE

## 2018-09-12 PROCEDURE — 99214 OFFICE O/P EST MOD 30 MIN: CPT | Performed by: FAMILY MEDICINE

## 2018-09-12 NOTE — PROGRESS NOTES
Subjective   Chief Complaint   Patient presents with   • Depression     4 weeks   • Med Refill     Nona Hernandez is a 68 y.o. female.   Depression (4 weeks) and Med Refill    History of Present Illness     cmh - copd, history of PE, hyperlipidemia, depression, diabetes, hypertension, memory impairment, insomnia, tobacco dependence, gerd    Copd - managed with advair, proair, spiriva inhalers  Has remained in remission of tobacco use    History of PE - managed with coumadin  Last INR 2.10  Due to repeat INR today  Current dose of coumadin 5mg nightly    Depression - managed with paxil and wellbutrin  Today she rates her symptoms a 0/10    Diabetes - managed with metformin  Followed by Dr Nuno   Cataract surgery done in [place]  Diabetic eye exam is current - [facility]  Diabetic foot exam is current  Hyperlipidemia - managed with zocor  B12 deficiency - with injections, managed every month    Hypertension - managed with cozaar    Tobacco dependence - currently smoking 1ppd    History of pulmonary nodules - repeat screening indicated 0.6mm nodule in the right upper lobe - to be rechecked in 6 months in January 2019    The following portions of the patient's history were reviewed and updated as appropriate: allergies, current medications, past family history, past medical history, past social history, past surgical history and problem list.    Review of Systems   Constitutional: Negative for appetite change, chills, fatigue and fever.   HENT: Negative for congestion, ear pain, rhinorrhea and sore throat.    Eyes: Negative for pain.   Respiratory: Positive for cough and wheezing. Negative for shortness of breath.    Cardiovascular: Negative for chest pain and palpitations.   Gastrointestinal: Negative for abdominal pain, constipation, diarrhea and nausea.   Genitourinary: Negative for dysuria.   Musculoskeletal: Negative for back pain, joint swelling and neck pain.   Skin: Negative for rash.   Neurological:  "Negative for dizziness and headaches.       Objective   /64   Pulse 102   Temp 97.9 °F (36.6 °C)   Ht 157.5 cm (62.01\")   Wt 58.7 kg (129 lb 4.8 oz)   SpO2 92%   BMI 23.64 kg/m²   Physical Exam   Constitutional: She is oriented to person, place, and time. She appears well-developed and well-nourished.   HENT:   Head: Normocephalic and atraumatic.   Eyes: Pupils are equal, round, and reactive to light.   Neck: Normal range of motion. Neck supple.   Cardiovascular: Normal rate, regular rhythm and normal heart sounds.    Pulmonary/Chest: Effort normal. No respiratory distress. She has decreased breath sounds. She has wheezes. She has no rales.   Abdominal: Soft. Bowel sounds are normal.   Musculoskeletal: Normal range of motion.   Neurological: She is alert and oriented to person, place, and time.   Skin: Skin is warm and dry.   Psychiatric: She has a normal mood and affect.   Nursing note and vitals reviewed.      Assessment/Plan   Problems Addressed this Visit        Cardiovascular and Mediastinum    Essential hypertension       Respiratory    Chronic obstructive pulmonary disease (CMS/HCC)       Endocrine    Type 2 diabetes mellitus without complication, without long-term current use of insulin (CMS/HCC) - Primary       Other    Current use of long term anticoagulation    Tobacco dependence syndrome        Call and request Diabetic eye exam - [facility]    Encouraged patient to recheck with pharmacy for 2nd dose of shingrix    Recommended flu vaccine next month    Due for lab work next month    Due for medicare wellness visit next month    Due for INR today    Patient's Body mass index is 23.64 kg/m². BMI is within normal parameters. No follow-up required.    I advised Nona of the risks of continuing to use tobacco, and I provided her with tobacco cessation educational materials in the After Visit Summary.     During this visit, I spent <3 minutes counseling the patient regarding tobacco " cessation.    Offered nebulizer treatment in office - patient declined    Recheck in 4 weeks

## 2018-09-12 NOTE — TELEPHONE ENCOUNTER
----- Message from Raya Velasquez MD sent at 9/12/2018  3:07 PM CDT -----  INR therapeutic - continue current dose of coumadin

## 2018-10-15 ENCOUNTER — OFFICE VISIT (OUTPATIENT)
Dept: FAMILY MEDICINE CLINIC | Facility: CLINIC | Age: 68
End: 2018-10-15

## 2018-10-15 VITALS
HEIGHT: 62 IN | OXYGEN SATURATION: 94 % | HEART RATE: 91 BPM | DIASTOLIC BLOOD PRESSURE: 82 MMHG | WEIGHT: 133 LBS | SYSTOLIC BLOOD PRESSURE: 140 MMHG | BODY MASS INDEX: 24.48 KG/M2

## 2018-10-15 DIAGNOSIS — F17.200 TOBACCO DEPENDENCE SYNDROME: ICD-10-CM

## 2018-10-15 DIAGNOSIS — Z00.00 ENCOUNTER FOR MEDICARE ANNUAL WELLNESS EXAM: Primary | ICD-10-CM

## 2018-10-15 DIAGNOSIS — I10 ESSENTIAL HYPERTENSION: ICD-10-CM

## 2018-10-15 DIAGNOSIS — Z23 INFLUENZA VACCINE NEEDED: ICD-10-CM

## 2018-10-15 DIAGNOSIS — Z79.01 CURRENT USE OF LONG TERM ANTICOAGULATION: ICD-10-CM

## 2018-10-15 DIAGNOSIS — J44.9 CHRONIC OBSTRUCTIVE PULMONARY DISEASE, UNSPECIFIED COPD TYPE (HCC): ICD-10-CM

## 2018-10-15 DIAGNOSIS — E11.9 TYPE 2 DIABETES MELLITUS WITHOUT COMPLICATION, WITHOUT LONG-TERM CURRENT USE OF INSULIN (HCC): ICD-10-CM

## 2018-10-15 PROCEDURE — G0439 PPPS, SUBSEQ VISIT: HCPCS | Performed by: FAMILY MEDICINE

## 2018-10-15 PROCEDURE — G0008 ADMIN INFLUENZA VIRUS VAC: HCPCS | Performed by: FAMILY MEDICINE

## 2018-10-15 PROCEDURE — 90662 IIV NO PRSV INCREASED AG IM: CPT | Performed by: FAMILY MEDICINE

## 2018-10-15 PROCEDURE — 99214 OFFICE O/P EST MOD 30 MIN: CPT | Performed by: FAMILY MEDICINE

## 2018-10-15 RX ORDER — HYDROXYUREA 500 MG/1
500 CAPSULE ORAL DAILY
Qty: 45 CAPSULE | Refills: 5 | Status: SHIPPED | OUTPATIENT
Start: 2018-10-15

## 2018-10-15 RX ORDER — PAROXETINE HYDROCHLORIDE 40 MG/1
40 TABLET, FILM COATED ORAL EVERY MORNING
Qty: 30 TABLET | Refills: 2 | Status: SHIPPED | OUTPATIENT
Start: 2018-10-15 | End: 2018-12-18 | Stop reason: SDUPTHER

## 2018-10-15 NOTE — PROGRESS NOTES
"Subjective   Chief Complaint   Patient presents with   • Medicare Wellness Visit     Nona Hernandez is a 68 y.o. female.   Medicare Wellness Visit    History of Present Illness     cmh - copd, history of PE, hyperlipidemia, depression, diabetes, hypertension, memory impairment, insomnia, tobacco dependence, gerd    Copd - managed with advair, proair, spiriva inhalers  Has remained in remission of tobacco use    History of PE - managed with coumadin  Last INR 2.10  Due to repeat INR today  Current dose of coumadin 5mg nightly    Diabetes - managed with metformin  Followed by Dr Nuno   Cataract surgery done in [place]  Diabetic eye exam is current - done by [facility]   Diabetic foot exam is current  Hyperlipidemia - managed with zocor  B12 deficiency - with injections, managed every month    Hypertension - managed with cozaar    Tobacco dependence - currently smoking 1ppd    Due for medicare wellness visit    The following portions of the patient's history were reviewed and updated as appropriate: allergies, current medications, past family history, past medical history, past social history, past surgical history and problem list.    Review of Systems   Constitutional: Negative for appetite change, chills, fatigue and fever.   HENT: Negative for congestion, ear pain, rhinorrhea and sore throat.    Eyes: Negative for pain.   Respiratory: Negative for cough and shortness of breath.    Cardiovascular: Negative for chest pain and palpitations.   Gastrointestinal: Negative for abdominal pain, constipation, diarrhea and nausea.   Genitourinary: Negative for dysuria.   Musculoskeletal: Negative for back pain, joint swelling and neck pain.   Skin: Negative for rash.   Neurological: Negative for dizziness and headaches.       Objective   /82   Pulse 91   Ht 157.5 cm (62.01\")   Wt 60.3 kg (133 lb)   SpO2 94%   BMI 24.32 kg/m²   Physical Exam   Constitutional: She is oriented to person, place, and time. She appears " well-developed and well-nourished.   HENT:   Head: Normocephalic and atraumatic.   Eyes: Pupils are equal, round, and reactive to light.   Neck: Normal range of motion. Neck supple.   Cardiovascular: Normal rate, regular rhythm and normal heart sounds.    Pulmonary/Chest: Effort normal and breath sounds normal. No respiratory distress. She has no wheezes. She has no rales.   Abdominal: Soft. Bowel sounds are normal.   Musculoskeletal: Normal range of motion.   Neurological: She is alert and oriented to person, place, and time.   Skin: Skin is warm and dry.   Psychiatric: She has a normal mood and affect.   Nursing note and vitals reviewed.      Assessment/Plan   Problems Addressed this Visit        Cardiovascular and Mediastinum    Essential hypertension       Respiratory    Chronic obstructive pulmonary disease (CMS/HCC)       Endocrine    Type 2 diabetes mellitus without complication, without long-term current use of insulin (CMS/HCC)    Relevant Orders    Hemoglobin A1c    CBC & Differential    Comprehensive Metabolic Panel       Other    Current use of long term anticoagulation    Tobacco dependence syndrome      Other Visit Diagnoses     Encounter for Medicare annual wellness exam    -  Primary    Influenza vaccine needed        Relevant Orders    Fluzone High Dose =>65Years        Medicare wellness visit done    Flu vaccine done today    Labs ordered    Medications refilled    Recheck in 4 weeks

## 2018-10-15 NOTE — PROGRESS NOTES
QUICK REFERENCE INFORMATION:  The ABCs of the Annual Wellness Visit    Subsequent Medicare Wellness Visit    HEALTH RISK ASSESSMENT    1950    Recent Hospitalizations:  No hospitalization(s) within the last year..        Current Medical Providers:  Patient Care Team:  Raya Velasquez MD as PCP - General (Family Medicine)  Alfredo Milner MD as PCP - Claims Attributed        Smoking Status:  History   Smoking Status   • Current Every Day Smoker   • Types: Cigarettes   Smokeless Tobacco   • Never Used       Alcohol Consumption:  History   Alcohol Use No       Depression Screen:   PHQ-2/PHQ-9 Depression Screening 10/15/2018   Little interest or pleasure in doing things 0   Feeling down, depressed, or hopeless 0   Trouble falling or staying asleep, or sleeping too much 0   Feeling tired or having little energy 0   Poor appetite or overeating 0   Feeling bad about yourself - or that you are a failure or have let yourself or your family down 0   Trouble concentrating on things, such as reading the newspaper or watching television 0   Moving or speaking so slowly that other people could have noticed. Or the opposite - being so fidgety or restless that you have been moving around a lot more than usual 0   Thoughts that you would be better off dead, or of hurting yourself in some way 0   Total Score 0       Health Habits and Functional and Cognitive Screening:  Functional & Cognitive Status 10/15/2018   Do you have difficulty preparing food and eating? No   Do you have difficulty bathing yourself, getting dressed or grooming yourself? No   Do you have difficulty using the toilet? No   Do you have difficulty moving around from place to place? No   Do you have trouble with steps or getting out of a bed or a chair? No   In the past year have you fallen or experienced a near fall? No   Current Diet Well Balanced Diet   Dental Exam Up to date   Eye Exam Up to date   Exercise (times per week) 0 times per week    Do you need help using the phone?  No   Are you deaf or do you have serious difficulty hearing?  No   Do you need help with transportation? No   Do you need help shopping? No   Do you need help preparing meals?  No   Do you need help with housework?  No   Do you need help with laundry? No   Do you need help taking your medications? No   Do you need help managing money? No   Do you ever drive or ride in a car without wearing a seat belt? No   Have you felt unusual stress, anger or loneliness in the last month? No   Who do you live with? Spouse   If you need help, do you have trouble finding someone available to you? No   Have you been bothered in the last four weeks by sexual problems? No   Do you have difficulty concentrating, remembering or making decisions? No     Fall Risk Assessment  Fallen in past 6 months: 0--> No  Mental Status: 0--> no mental status change  Mobility: 0--> No mobility issues  Medications: 1--> Insulin/ Oral hypoglycemic  Total Fall Risk Score: 3      Does the patient have evidence of cognitive impairment? No    Aspirin use counseling: currently using warfarin      Recent Lab Results:  CMP:  Lab Results   Component Value Date    BUN 6 (L) 04/11/2018    CREATININE 0.50 04/11/2018    EGFRIFNONA 122 (H) 04/11/2018    BCR 12.0 04/11/2018     04/11/2018    K 3.9 04/11/2018    CO2 32.0 04/11/2018    CALCIUM 9.1 04/11/2018    ALBUMIN 4.10 04/11/2018    BILITOT 0.6 04/11/2018    ALKPHOS 49 04/11/2018    AST 20 04/11/2018    ALT 18 04/11/2018     Lipid Panel:  Lab Results   Component Value Date    CHOL 150 04/11/2018    TRIG 92 04/11/2018    HDL 54 04/11/2018    VLDL 18.4 04/11/2018    LDLHDL 1.44 04/11/2018     HbA1c:  Lab Results   Component Value Date    HGBA1C 7.4 (H) 04/11/2018       Visual Acuity:  No exam data present    Age-appropriate Screening Schedule:  Refer to the list below for future screening recommendations based on patient's age, sex and/or medical conditions. Orders for these  recommended tests are listed in the plan section. The patient has been provided with a written plan.    Health Maintenance   Topic Date Due   • DIABETIC EYE EXAM  08/05/2016   • INFLUENZA VACCINE  08/01/2018   • HEMOGLOBIN A1C  10/11/2018   • URINE MICROALBUMIN  11/20/2018   • LIPID PANEL  04/11/2019   • DIABETIC FOOT EXAM  06/13/2019   • MAMMOGRAM  11/06/2019   • COLONOSCOPY  03/02/2023   • TDAP/TD VACCINES (2 - Td) 03/02/2025   • PNEUMOCOCCAL VACCINES (65+ LOW/MEDIUM RISK)  Completed   • ZOSTER VACCINE  Completed        Subjective   History of Present Illness    Nona Hernandez is a 68 y.o. female who presents for an Subsequent Wellness Visit.    The following portions of the patient's history were reviewed and updated as appropriate: allergies, current medications, past family history, past medical history, past social history, past surgical history and problem list.    Outpatient Medications Prior to Visit   Medication Sig Dispense Refill   • buPROPion XL (WELLBUTRIN XL) 150 MG 24 hr tablet Take 1 tablet by mouth Daily. 90 tablet 1   • donepezil (ARICEPT) 10 MG tablet Take 1 tablet by mouth Daily. 90 tablet 1   • fluticasone-salmeterol (ADVAIR DISKUS) 250-50 MCG/DOSE DISKUS Inhale 1 puff 2 (Two) Times a Day. 60 each 5   • glucose blood test strip To test blood glucose 1 - 2 times a day 50 each 12   • glucose monitor monitoring kit For diabetes mellitus 1 each 0   • Lancets misc To test blood glucose 1 -2 times a day 100 each 12   • losartan (COZAAR) 50 MG tablet Take 1 tablet by mouth Daily. 90 tablet 1   • memantine (NAMENDA) 5 MG tablet Take 1 tablet daily 90 tablet 1   • metFORMIN (GLUCOPHAGE) 500 MG tablet Take 1 tablet by mouth 2 (Two) Times a Day With Meals. 180 tablet 1   • pantoprazole (PROTONIX) 40 MG EC tablet Take 1 tablet by mouth Daily. 90 tablet 1   • PROAIR  (90 Base) MCG/ACT inhaler INHALE 2 PUFFS BY MOUTH FOUR TIMES A DAY AS NEEDED FOR WHEEZING 17 g 0   • simvastatin (ZOCOR) 40 MG tablet  "Take 1 tablet by mouth Daily. 90 tablet 1   • tiotropium (SPIRIVA HANDIHALER) 18 MCG per inhalation capsule Place 1 capsule into inhaler and inhale Daily. 90 capsule 1   • vitamin D (ERGOCALCIFEROL) 94012 units capsule capsule TAKE 1 CAPSULE BY MOUTH TWICE A WEEK 8 capsule 5   • warfarin (COUMADIN) 5 MG tablet Take 1 tablet daily 90 tablet 0   • zolpidem (AMBIEN) 10 MG tablet Take 1 tablet by mouth At Night As Needed for Sleep. 90 tablet 0   • Cyanocobalamin 1000 MCG/ML kit Inject 1 mL as directed Every 30 (Thirty) Days. 1 kit 11   • hydroxyurea (HYDREA) 500 MG capsule Take 1 capsule by mouth Daily. M-SUN, MW TAKES 2 times daily 45 capsule 5   • PARoxetine (PAXIL) 40 MG tablet Take 1 tablet by mouth Every Morning. 30 tablet 2     No facility-administered medications prior to visit.        Patient Active Problem List   Diagnosis   • Dementia   • Cyanocobalamin deficiency   • Current use of long term anticoagulation   • Essential hypertension   • Chronic obstructive pulmonary disease (CMS/HCC)   • Type 2 diabetes mellitus without complication, without long-term current use of insulin (CMS/HCC)   • Gastroesophageal reflux disease   • Hyperlipidemia   • Memory impairment   • History of pulmonary embolism   • Tobacco dependence syndrome   • Recurrent major depressive disorder, in full remission (CMS/HCC)   • Pulmonary nodule   • Sinus bradycardia       Advance Care Planning:  has NO advance directive - information provided to the patient today    Identification of Risk Factors:  Risk factors include: weight , unhealthy diet, cardiovascular risk and tobacco use.    Review of Systems    Compared to one year ago, the patient feels her physical health is the same.  Compared to one year ago, the patient feels her mental health is the same.    Objective     Physical Exam    Vitals:    10/15/18 1346   BP: 140/82   Pulse: 91   SpO2: 94%   Weight: 60.3 kg (133 lb)   Height: 157.5 cm (62.01\")   PainSc: 0-No pain       Patient's " Body mass index is 24.32 kg/m². BMI is within normal parameters. No follow-up required.      Assessment/Plan   Patient Self-Management and Personalized Health Advice  The patient has been provided with information about: diet, exercise, weight management, prevention of cardiac or vascular disease, tobacco cessation, fall prevention and designing advance directives and preventive services including:   · Advance directive, Exercise counseling provided, Fall Risk assessment done, Fall Risk plan of care done, Influenza vaccine, Nutrition counseling provided, Smoking cessation counseling completed, Zostavax vaccine (Herpes Zoster).    Visit Diagnoses:    ICD-10-CM ICD-9-CM   1. Encounter for Medicare annual wellness exam Z00.00 V70.0   2. Type 2 diabetes mellitus without complication, without long-term current use of insulin (CMS/[facility]) E11.9 250.00   3. Influenza vaccine needed Z23 V04.81   4. Tobacco dependence syndrome F17.200 305.1   5. Current use of long term anticoagulation Z79.01 V58.61   6. Chronic obstructive pulmonary disease, unspecified COPD type (CMS/[facility]) J44.9 496   7. Essential hypertension I10 401.9       Orders Placed This Encounter   Procedures   • Fluzone High Dose =>65Years   • Hemoglobin A1c     Standing Status:   Future     Standing Expiration Date:   11/19/2019   • Comprehensive Metabolic Panel     Standing Status:   Future     Standing Expiration Date:   7/12/2019   • CBC & Differential     Standing Status:   Future     Standing Expiration Date:   4/13/2019     Order Specific Question:   Manual Differential     Answer:   No       Outpatient Encounter Prescriptions as of 10/15/2018   Medication Sig Dispense Refill   • buPROPion XL (WELLBUTRIN XL) 150 MG 24 hr tablet Take 1 tablet by mouth Daily. 90 tablet 1   • Cyanocobalamin 1000 MCG/ML kit Inject 1 mL as directed Every 30 (Thirty) Days. 1 kit 11   • donepezil (ARICEPT) 10 MG tablet Take 1 tablet by mouth Daily. 90 tablet 1   •  fluticasone-salmeterol (ADVAIR DISKUS) 250-50 MCG/DOSE DISKUS Inhale 1 puff 2 (Two) Times a Day. 60 each 5   • glucose blood test strip To test blood glucose 1 - 2 times a day 50 each 12   • glucose monitor monitoring kit For diabetes mellitus 1 each 0   • hydroxyurea (HYDREA) 500 MG capsule Take 1 capsule by mouth Daily. M-SUN, [facility] TAKES 2 times daily 45 capsule 5   • Lancets misc To test blood glucose 1 -2 times a day 100 each 12   • losartan (COZAAR) 50 MG tablet Take 1 tablet by mouth Daily. 90 tablet 1   • memantine (NAMENDA) 5 MG tablet Take 1 tablet daily 90 tablet 1   • metFORMIN (GLUCOPHAGE) 500 MG tablet Take 1 tablet by mouth 2 (Two) Times a Day With Meals. 180 tablet 1   • pantoprazole (PROTONIX) 40 MG EC tablet Take 1 tablet by mouth Daily. 90 tablet 1   • PARoxetine (PAXIL) 40 MG tablet Take 1 tablet by mouth Every Morning. 30 tablet 2   • PROAIR  (90 Base) MCG/ACT inhaler INHALE 2 PUFFS BY MOUTH FOUR TIMES A DAY AS NEEDED FOR WHEEZING 17 g 0   • simvastatin (ZOCOR) 40 MG tablet Take 1 tablet by mouth Daily. 90 tablet 1   • tiotropium (SPIRIVA HANDIHALER) 18 MCG per inhalation capsule Place 1 capsule into inhaler and inhale Daily. 90 capsule 1   • vitamin D (ERGOCALCIFEROL) 58257 units capsule capsule TAKE 1 CAPSULE BY MOUTH TWICE A WEEK 8 capsule 5   • warfarin (COUMADIN) 5 MG tablet Take 1 tablet daily 90 tablet 0   • zolpidem (AMBIEN) 10 MG tablet Take 1 tablet by mouth At Night As Needed for Sleep. 90 tablet 0   • [DISCONTINUED] Cyanocobalamin 1000 MCG/ML kit Inject 1 mL as directed Every 30 (Thirty) Days. 1 kit 11   • [DISCONTINUED] hydroxyurea (HYDREA) 500 MG capsule Take 1 capsule by mouth Daily. MSUN, [facility] TAKES 2 times daily 45 capsule 5   • [DISCONTINUED] PARoxetine (PAXIL) 40 MG tablet Take 1 tablet by mouth Every Morning. 30 tablet 2     No facility-administered encounter medications on file as of 10/15/2018.        Reviewed use of high risk medication in the elderly: yes  Reviewed  for potential of harmful drug interactions in the elderly: yes    Follow Up:  Return in about 4 weeks (around 11/12/2018), or if symptoms worsen or fail to improve.     An After Visit Summary and PPPS with all of these plans were given to the patient.

## 2018-10-15 NOTE — PATIENT INSTRUCTIONS
Medicare Wellness  Personal Prevention Plan of Service     Date of Office Visit:  10/15/2018  Encounter Provider:  Raya Velasquez MD  Place of Service:  [facility]  Patient Name: Nona Hernandez  :  1950    As part of the Medicare Wellness portion of your visit today, we are providing you with this personalized preventive plan of services (PPPS). This plan is based upon recommendations of the [place] Preventive Services Task Force (USPSTF) and the Advisory Committee on Immunization Practices (ACIP).    This lists the preventive care services that should be considered, and provides dates of when you are due. Items listed as completed are up-to-date and do not require any further intervention.    Health Maintenance   Topic Date Due   • HEPATITIS C SCREENING  2016   • DIABETIC EYE EXAM  2016   • INFLUENZA VACCINE  2018   • MEDICARE ANNUAL WELLNESS  10/02/2018   • HEMOGLOBIN A1C  10/11/2018   • URINE MICROALBUMIN  2018   • LIPID PANEL  2019   • DIABETIC FOOT EXAM  2019   • MAMMOGRAM  2019   • COLONOSCOPY  2023   • TDAP/TD VACCINES (2 - Td) 2025   • PNEUMOCOCCAL VACCINES (65+ LOW/MEDIUM RISK)  Completed   • ZOSTER VACCINE  Completed       No orders of the defined types were placed in this encounter.      Return in about 3 months (around 1/15/2019), or if symptoms worsen or fail to improve.

## 2018-10-18 RX ORDER — TIOTROPIUM BROMIDE 18 UG/1
CAPSULE ORAL; RESPIRATORY (INHALATION)
Qty: 90 CAPSULE | Refills: 1 | Status: SHIPPED | OUTPATIENT
Start: 2018-10-18

## 2018-11-16 RX ORDER — BUPROPION HYDROCHLORIDE 150 MG/1
150 TABLET ORAL DAILY
Qty: 90 TABLET | Refills: 1 | Status: SHIPPED | OUTPATIENT
Start: 2018-11-16 | End: 2019-01-14 | Stop reason: SDUPTHER

## 2018-12-10 RX ORDER — WARFARIN SODIUM 5 MG/1
TABLET ORAL
Qty: 90 TABLET | Refills: 0 | Status: SHIPPED | OUTPATIENT
Start: 2018-12-10 | End: 2019-01-14 | Stop reason: SDUPTHER

## 2018-12-19 RX ORDER — PAROXETINE HYDROCHLORIDE 40 MG/1
40 TABLET, FILM COATED ORAL EVERY MORNING
Qty: 30 TABLET | Refills: 2 | Status: SHIPPED | OUTPATIENT
Start: 2018-12-19 | End: 2019-01-14 | Stop reason: SDUPTHER

## 2019-01-10 RX ORDER — WARFARIN SODIUM 1 MG/1
2 TABLET ORAL DAILY
Refills: 5 | COMMUNITY
Start: 2018-11-16 | End: 2019-01-10 | Stop reason: SDUPTHER

## 2019-01-10 RX ORDER — WARFARIN SODIUM 1 MG/1
2 TABLET ORAL DAILY
Qty: 60 TABLET | Refills: 5 | Status: SHIPPED | OUTPATIENT
Start: 2019-01-10

## 2019-01-14 ENCOUNTER — LAB (OUTPATIENT)
Dept: LAB | Facility: OTHER | Age: 69
End: 2019-01-14

## 2019-01-14 ENCOUNTER — OFFICE VISIT (OUTPATIENT)
Dept: FAMILY MEDICINE CLINIC | Facility: CLINIC | Age: 69
End: 2019-01-14

## 2019-01-14 ENCOUNTER — TELEPHONE (OUTPATIENT)
Dept: FAMILY MEDICINE CLINIC | Facility: CLINIC | Age: 69
End: 2019-01-14

## 2019-01-14 VITALS
SYSTOLIC BLOOD PRESSURE: 122 MMHG | WEIGHT: 134 LBS | HEIGHT: 62 IN | TEMPERATURE: 98.3 F | RESPIRATION RATE: 16 BRPM | HEART RATE: 96 BPM | DIASTOLIC BLOOD PRESSURE: 74 MMHG | BODY MASS INDEX: 24.66 KG/M2 | OXYGEN SATURATION: 92 %

## 2019-01-14 DIAGNOSIS — F17.200 TOBACCO DEPENDENCE SYNDROME: ICD-10-CM

## 2019-01-14 DIAGNOSIS — E78.5 HYPERLIPIDEMIA, UNSPECIFIED HYPERLIPIDEMIA TYPE: ICD-10-CM

## 2019-01-14 DIAGNOSIS — I10 ESSENTIAL HYPERTENSION: ICD-10-CM

## 2019-01-14 DIAGNOSIS — Z86.711 HISTORY OF PULMONARY EMBOLISM: ICD-10-CM

## 2019-01-14 DIAGNOSIS — R41.3 MEMORY IMPAIRMENT: ICD-10-CM

## 2019-01-14 DIAGNOSIS — F33.42 RECURRENT MAJOR DEPRESSIVE DISORDER, IN FULL REMISSION (HCC): ICD-10-CM

## 2019-01-14 DIAGNOSIS — J44.1 COPD WITH ACUTE EXACERBATION (HCC): Primary | ICD-10-CM

## 2019-01-14 DIAGNOSIS — Z79.01 CURRENT USE OF LONG TERM ANTICOAGULATION: ICD-10-CM

## 2019-01-14 DIAGNOSIS — E11.9 TYPE 2 DIABETES MELLITUS WITHOUT COMPLICATION, WITHOUT LONG-TERM CURRENT USE OF INSULIN (HCC): ICD-10-CM

## 2019-01-14 LAB
ALBUMIN SERPL-MCNC: 4.8 G/DL (ref 3.5–5)
ALBUMIN UR-MCNC: 5.3 MG/L
ALBUMIN/GLOB SERPL: 1.8 G/DL (ref 1.1–1.8)
ALP SERPL-CCNC: 63 U/L (ref 38–126)
ALT SERPL W P-5'-P-CCNC: 17 U/L
ANION GAP SERPL CALCULATED.3IONS-SCNC: 7 MMOL/L (ref 5–15)
AST SERPL-CCNC: 28 U/L (ref 14–36)
BILIRUB SERPL-MCNC: 0.7 MG/DL (ref 0.2–1.3)
BUN BLD-MCNC: 6 MG/DL (ref 7–17)
BUN/CREAT SERPL: 10.7 (ref 7–25)
CALCIUM SPEC-SCNC: 9.5 MG/DL (ref 8.4–10.2)
CHLORIDE SERPL-SCNC: 96 MMOL/L (ref 98–107)
CO2 SERPL-SCNC: 32 MMOL/L (ref 22–30)
CREAT BLD-MCNC: 0.56 MG/DL (ref 0.52–1.04)
DEPRECATED RDW RBC AUTO: 51.1 FL (ref 36.4–46.3)
EOSINOPHIL # BLD MANUAL: 0.1 10*3/MM3 (ref 0–0.7)
EOSINOPHIL NFR BLD MANUAL: 1 % (ref 0–7)
ERYTHROCYTE [DISTWIDTH] IN BLOOD BY AUTOMATED COUNT: 13.2 % (ref 11.5–14.5)
GFR SERPL CREATININE-BSD FRML MDRD: 107 ML/MIN/1.73 (ref 39–90)
GLOBULIN UR ELPH-MCNC: 2.7 GM/DL (ref 2.3–3.5)
GLUCOSE BLD-MCNC: 177 MG/DL (ref 74–99)
HCT VFR BLD AUTO: 47.6 % (ref 35–45)
HGB BLD-MCNC: 15.4 G/DL (ref 12–15.5)
INR PPP: 1.82 (ref 0.8–1.2)
LYMPHOCYTES # BLD MANUAL: 2.42 10*3/MM3 (ref 0.6–4.2)
LYMPHOCYTES NFR BLD MANUAL: 24 % (ref 10–50)
LYMPHOCYTES NFR BLD MANUAL: 4 % (ref 0–12)
MACROCYTES BLD QL SMEAR: NORMAL
MCH RBC QN AUTO: 34.8 PG (ref 26.5–34)
MCHC RBC AUTO-ENTMCNC: 32.4 G/DL (ref 31.4–36)
MCV RBC AUTO: 107.7 FL (ref 80–98)
MONOCYTES # BLD AUTO: 0.4 10*3/MM3 (ref 0–0.9)
NEUTROPHILS # BLD AUTO: 7.15 10*3/MM3 (ref 2–8.6)
NEUTROPHILS NFR BLD MANUAL: 71 % (ref 37–80)
PLATELET # BLD AUTO: 585 10*3/MM3 (ref 150–450)
PMV BLD AUTO: 10.2 FL (ref 8–12)
POTASSIUM BLD-SCNC: 4.2 MMOL/L (ref 3.4–5)
PROT SERPL-MCNC: 7.5 G/DL (ref 6.3–8.2)
PROTHROMBIN TIME: 20.4 SECONDS (ref 11.1–15.3)
RBC # BLD AUTO: 4.42 10*6/MM3 (ref 3.77–5.16)
SCAN SLIDE: NORMAL
SMALL PLATELETS BLD QL SMEAR: NORMAL
SODIUM BLD-SCNC: 135 MMOL/L (ref 137–145)
WBC MORPH BLD: NORMAL
WBC NRBC COR # BLD: 10.07 10*3/MM3 (ref 3.2–9.8)

## 2019-01-14 PROCEDURE — 83036 HEMOGLOBIN GLYCOSYLATED A1C: CPT | Performed by: FAMILY MEDICINE

## 2019-01-14 PROCEDURE — 36415 COLL VENOUS BLD VENIPUNCTURE: CPT | Performed by: FAMILY MEDICINE

## 2019-01-14 PROCEDURE — 80053 COMPREHEN METABOLIC PANEL: CPT | Performed by: FAMILY MEDICINE

## 2019-01-14 PROCEDURE — 85025 COMPLETE CBC W/AUTO DIFF WBC: CPT | Performed by: FAMILY MEDICINE

## 2019-01-14 PROCEDURE — 99214 OFFICE O/P EST MOD 30 MIN: CPT | Performed by: FAMILY MEDICINE

## 2019-01-14 PROCEDURE — 82043 UR ALBUMIN QUANTITATIVE: CPT | Performed by: FAMILY MEDICINE

## 2019-01-14 PROCEDURE — 85610 PROTHROMBIN TIME: CPT | Performed by: FAMILY MEDICINE

## 2019-01-14 RX ORDER — PREDNISONE 20 MG/1
20 TABLET ORAL DAILY
Qty: 5 TABLET | Refills: 0 | Status: SHIPPED | OUTPATIENT
Start: 2019-01-14

## 2019-01-14 RX ORDER — WARFARIN SODIUM 5 MG/1
TABLET ORAL
Qty: 90 TABLET | Refills: 0 | Status: SHIPPED | OUTPATIENT
Start: 2019-01-14

## 2019-01-14 RX ORDER — PAROXETINE HYDROCHLORIDE 40 MG/1
40 TABLET, FILM COATED ORAL EVERY MORNING
Qty: 90 TABLET | Refills: 1 | Status: SHIPPED | OUTPATIENT
Start: 2019-01-14

## 2019-01-14 RX ORDER — DONEPEZIL HYDROCHLORIDE 10 MG/1
10 TABLET, FILM COATED ORAL DAILY
Qty: 90 TABLET | Refills: 1 | Status: SHIPPED | OUTPATIENT
Start: 2019-01-14

## 2019-01-14 RX ORDER — BUPROPION HYDROCHLORIDE 150 MG/1
150 TABLET ORAL DAILY
Qty: 90 TABLET | Refills: 1 | Status: SHIPPED | OUTPATIENT
Start: 2019-01-14

## 2019-01-14 RX ORDER — SIMVASTATIN 40 MG
40 TABLET ORAL DAILY
Qty: 90 TABLET | Refills: 1 | Status: SHIPPED | OUTPATIENT
Start: 2019-01-14

## 2019-01-14 RX ORDER — MEMANTINE HYDROCHLORIDE 5 MG/1
TABLET ORAL
Qty: 90 TABLET | Refills: 1 | Status: SHIPPED | OUTPATIENT
Start: 2019-01-14

## 2019-01-14 RX ORDER — LOSARTAN POTASSIUM 50 MG/1
50 TABLET ORAL DAILY
Qty: 90 TABLET | Refills: 1 | Status: SHIPPED | OUTPATIENT
Start: 2019-01-14

## 2019-01-14 NOTE — PROGRESS NOTES
"Subjective   Chief Complaint   Patient presents with   • Med Refill      and chills       Nona Hernandez is a 68 y.o. female.   Med Refill ( and chills  )    History of Present Illness     [facility] - copd, history of PE, hyperlipidemia, depression, diabetes, hypertension, memory impairment, insomnia, tobacco dependence, gerd    Complaints of chest congestion, shortness of breath, wheezing, nonproductive cough  Started about 2 weeks ago  Has been using nyquil    Copd - managed with advair, proair, spiriva inhalers  Has remained in remission of tobacco use    History of PE - managed with coumadin  Due to repeat INR today  Current dose of coumadin 5mg nightly    Diabetes - managed with metformin  Followed by Dr Nuno   Cataract surgery done in [place]  Diabetic eye exam is due  Diabetic foot exam is current  Hyperlipidemia - managed with zocor  B12 deficiency - with injections, managed every month    Hypertension - managed with cozaar    Tobacco dependence - currently smoking 1ppd    The following portions of the patient's history were reviewed and updated as appropriate: allergies, current medications, past family history, past medical history, past social history, past surgical history and problem list.    Review of Systems   Constitutional: Positive for chills and fatigue. Negative for appetite change and fever.   HENT: Negative for congestion, ear pain, rhinorrhea and sore throat.    Eyes: Negative for pain.   Respiratory: Positive for cough, shortness of breath and wheezing.    Cardiovascular: Negative for chest pain and palpitations.   Gastrointestinal: Negative for abdominal pain, constipation, diarrhea and nausea.   Genitourinary: Negative for dysuria.   Musculoskeletal: Negative for back pain, joint swelling and neck pain.   Skin: Negative for rash.   Neurological: Negative for dizziness and headaches.       Objective   /74   Pulse 96   Temp 98.3 °F (36.8 °C) (Temporal)   Resp 16   Ht 157.5 cm (62.01\") "   Wt 60.8 kg (134 lb)   SpO2 92%   Breastfeeding? No   BMI 24.50 kg/m²   Physical Exam   Constitutional: She is oriented to person, place, and time. She appears well-developed and well-nourished.   HENT:   Head: Normocephalic and atraumatic.   Right Ear: External ear normal.   Left Ear: External ear normal.   Nose: No sinus tenderness. Right sinus exhibits no maxillary sinus tenderness and no frontal sinus tenderness. Left sinus exhibits no maxillary sinus tenderness and no frontal sinus tenderness.   Mouth/Throat: Mucous membranes are normal. No posterior oropharyngeal erythema.   Eyes: Pupils are equal, round, and reactive to light.   Neck: Normal range of motion. Neck supple.   Cardiovascular: Normal rate, regular rhythm and normal heart sounds.   Pulmonary/Chest: Effort normal. No respiratory distress. She has decreased breath sounds. She has wheezes. She has rales.   Abdominal: Soft. Bowel sounds are normal.   Musculoskeletal: Normal range of motion.   Neurological: She is alert and oriented to person, place, and time.   Skin: Skin is warm and dry.   Psychiatric: She has a normal mood and affect.   Nursing note and vitals reviewed.      Assessment/Plan   Problems Addressed this Visit        Cardiovascular and Mediastinum    Essential hypertension    Relevant Medications    losartan (COZAAR) 50 MG tablet    Hyperlipidemia    Relevant Medications    simvastatin (ZOCOR) 40 MG tablet       Endocrine    Type 2 diabetes mellitus without complication, without long-term current use of insulin (CMS/[facility])    Relevant Medications    metFORMIN (GLUCOPHAGE) 500 MG tablet    Other Relevant Orders    MicroAlbumin, Urine, Random - Urine, Clean Catch       Other    Current use of long term anticoagulation    Relevant Medications    warfarin (COUMADIN) 5 MG tablet    Memory impairment    Relevant Medications    memantine (NAMENDA) 5 MG tablet    donepezil (ARICEPT) 10 MG tablet    History of pulmonary embolism    Relevant  Medications    warfarin (COUMADIN) 5 MG tablet    Tobacco dependence syndrome    Recurrent major depressive disorder, in full remission (CMS/HCC)    Relevant Medications    memantine (NAMENDA) 5 MG tablet    PARoxetine (PAXIL) 40 MG tablet    donepezil (ARICEPT) 10 MG tablet    buPROPion XL (WELLBUTRIN XL) 150 MG 24 hr tablet      Other Visit Diagnoses     COPD with acute exacerbation (CMS/HCC)    -  Primary    Relevant Medications    predniSONE (DELTASONE) 20 MG tablet    Other Relevant Orders    XR Chest PA & Lateral (Completed)        The patient has read and signed the [facility] Controlled Substance Contract.  I will continue to see patient for regular follow up appointments.  They are well controlled on their medication.  [facility] is updated every 3 months. The patient is aware of the potential for addiction and dependence.  [facility] [id number]    Labs ordered    INR to be checked today    cxr ordered - negative  Start prednisone x 5 days  Cautioned patient with glucose levels  If symptoms fail to improve consider adding antibiotics  Patient advised to call the office if symptoms worsen    Microalbuminuria added to lab work already ordered    Refilled maintenance medications    Patient's Body mass index is 24.5 kg/m². BMI is within normal parameters. No follow-up required.    Recheck in 4 weeks

## 2019-01-14 NOTE — TELEPHONE ENCOUNTER
----- Message from Raya Velasquez MD sent at 1/14/2019  3:00 PM CST -----  cxr is negative - no infection - stable emphysema.  Recommend a couple of days of steroid tablets.  Sent into pharmacy.

## 2019-01-16 LAB — HBA1C MFR BLD: 7.5 % (ref 4–5.6)

## 2019-02-25 RX ORDER — PANTOPRAZOLE SODIUM 40 MG/1
40 TABLET, DELAYED RELEASE ORAL DAILY
Qty: 90 TABLET | Refills: 1 | Status: SHIPPED | OUTPATIENT
Start: 2019-02-25

## 2019-03-04 RX ORDER — ALBUTEROL SULFATE 90 UG/1
AEROSOL, METERED RESPIRATORY (INHALATION)
Qty: 8.5 G | Refills: 0 | Status: SHIPPED | OUTPATIENT
Start: 2019-03-04

## 2019-05-10 RX ORDER — ALBUTEROL SULFATE 90 UG/1
AEROSOL, METERED RESPIRATORY (INHALATION)
Qty: 8.5 G | Refills: 0 | OUTPATIENT
Start: 2019-05-10

## 2019-05-14 ENCOUNTER — OFFICE VISIT (OUTPATIENT)
Dept: FAMILY MEDICINE CLINIC | Facility: CLINIC | Age: 69
End: 2019-05-14

## 2019-05-14 ENCOUNTER — LAB (OUTPATIENT)
Dept: LAB | Facility: OTHER | Age: 69
End: 2019-05-14

## 2019-05-14 ENCOUNTER — TELEPHONE (OUTPATIENT)
Dept: FAMILY MEDICINE CLINIC | Facility: CLINIC | Age: 69
End: 2019-05-14

## 2019-05-14 VITALS
WEIGHT: 136 LBS | RESPIRATION RATE: 16 BRPM | HEIGHT: 62 IN | BODY MASS INDEX: 25.03 KG/M2 | HEART RATE: 104 BPM | OXYGEN SATURATION: 96 % | DIASTOLIC BLOOD PRESSURE: 70 MMHG | TEMPERATURE: 97.8 F | SYSTOLIC BLOOD PRESSURE: 124 MMHG

## 2019-05-14 DIAGNOSIS — Z87.891 PERSONAL HISTORY OF NICOTINE DEPENDENCE: ICD-10-CM

## 2019-05-14 DIAGNOSIS — Z79.01 CURRENT USE OF LONG TERM ANTICOAGULATION: Primary | ICD-10-CM

## 2019-05-14 DIAGNOSIS — D72.829 LEUKOCYTOSIS, UNSPECIFIED TYPE: Primary | ICD-10-CM

## 2019-05-14 DIAGNOSIS — E53.8 CYANOCOBALAMIN DEFICIENCY: ICD-10-CM

## 2019-05-14 DIAGNOSIS — E11.9 TYPE 2 DIABETES MELLITUS WITHOUT COMPLICATION, WITHOUT LONG-TERM CURRENT USE OF INSULIN (HCC): ICD-10-CM

## 2019-05-14 DIAGNOSIS — R05.9 COUGH: ICD-10-CM

## 2019-05-14 DIAGNOSIS — Z12.2 ENCOUNTER FOR SCREENING FOR LUNG CANCER: ICD-10-CM

## 2019-05-14 DIAGNOSIS — I10 ESSENTIAL HYPERTENSION: Chronic | ICD-10-CM

## 2019-05-14 DIAGNOSIS — J44.9 CHRONIC OBSTRUCTIVE PULMONARY DISEASE, UNSPECIFIED COPD TYPE (HCC): ICD-10-CM

## 2019-05-14 LAB
ANION GAP SERPL CALCULATED.3IONS-SCNC: 5 MMOL/L (ref 5–15)
BUN BLD-MCNC: 8 MG/DL (ref 7–23)
BUN/CREAT SERPL: 16.3 (ref 7–25)
CALCIUM SPEC-SCNC: 9.7 MG/DL (ref 8.4–10.2)
CHLORIDE SERPL-SCNC: 96 MMOL/L (ref 101–112)
CO2 SERPL-SCNC: 35 MMOL/L (ref 22–30)
CREAT BLD-MCNC: 0.49 MG/DL (ref 0.52–1.04)
DEPRECATED RDW RBC AUTO: 52.8 FL (ref 37–54)
ERYTHROCYTE [DISTWIDTH] IN BLOOD BY AUTOMATED COUNT: 13.6 % (ref 12.3–15.4)
GFR SERPL CREATININE-BSD FRML MDRD: 125 ML/MIN/1.73 (ref 39–90)
GLUCOSE BLD-MCNC: 239 MG/DL (ref 70–99)
HCT VFR BLD AUTO: 41.9 % (ref 34–46.6)
HGB BLD-MCNC: 14.5 G/DL (ref 12–15.9)
INR PPP: 2.14 (ref 0.8–1.2)
LARGE PLATELETS: ABNORMAL
LYMPHOCYTES # BLD MANUAL: 2.94 10*3/MM3 (ref 0.7–3.1)
LYMPHOCYTES NFR BLD MANUAL: 27 % (ref 19.6–45.3)
LYMPHOCYTES NFR BLD MANUAL: 9 % (ref 5–12)
MACROCYTES BLD QL SMEAR: ABNORMAL
MCH RBC QN AUTO: 36.6 PG (ref 26.6–33)
MCHC RBC AUTO-ENTMCNC: 34.6 G/DL (ref 31.5–35.7)
MCV RBC AUTO: 105.8 FL (ref 79–97)
MONOCYTES # BLD AUTO: 0.98 10*3/MM3 (ref 0.1–0.9)
NEUTROPHILS # BLD AUTO: 6.98 10*3/MM3 (ref 1.7–7)
NEUTROPHILS NFR BLD MANUAL: 64 % (ref 42.7–76)
PLATELET # BLD AUTO: 658 10*3/MM3 (ref 140–450)
PMV BLD AUTO: 9.6 FL (ref 6–12)
POTASSIUM BLD-SCNC: 5 MMOL/L (ref 3.4–5)
PROTHROMBIN TIME: 23.1 SECONDS (ref 11.1–15.3)
RBC # BLD AUTO: 3.96 10*6/MM3 (ref 3.77–5.28)
SCAN SLIDE: NORMAL
SMALL PLATELETS BLD QL SMEAR: ABNORMAL
SODIUM BLD-SCNC: 136 MMOL/L (ref 137–145)
WBC MORPH BLD: NORMAL
WBC NRBC COR # BLD: 10.9 10*3/MM3 (ref 3.4–10.8)

## 2019-05-14 PROCEDURE — 36415 COLL VENOUS BLD VENIPUNCTURE: CPT | Performed by: NURSE PRACTITIONER

## 2019-05-14 PROCEDURE — 82607 VITAMIN B-12: CPT | Performed by: NURSE PRACTITIONER

## 2019-05-14 PROCEDURE — 85025 COMPLETE CBC W/AUTO DIFF WBC: CPT | Performed by: NURSE PRACTITIONER

## 2019-05-14 PROCEDURE — 99213 OFFICE O/P EST LOW 20 MIN: CPT | Performed by: NURSE PRACTITIONER

## 2019-05-14 PROCEDURE — 85610 PROTHROMBIN TIME: CPT | Performed by: NURSE PRACTITIONER

## 2019-05-14 PROCEDURE — 83036 HEMOGLOBIN GLYCOSYLATED A1C: CPT | Performed by: NURSE PRACTITIONER

## 2019-05-14 PROCEDURE — 80048 BASIC METABOLIC PNL TOTAL CA: CPT | Performed by: NURSE PRACTITIONER

## 2019-05-14 RX ORDER — ALBUTEROL SULFATE 90 UG/1
2 AEROSOL, METERED RESPIRATORY (INHALATION) EVERY 4 HOURS PRN
Qty: 17 G | Refills: 11 | Status: SHIPPED | OUTPATIENT
Start: 2019-05-14 | End: 2020-05-11

## 2019-05-14 RX ORDER — ALBUTEROL SULFATE 90 UG/1
AEROSOL, METERED RESPIRATORY (INHALATION)
Qty: 8.5 G | Refills: 0 | Status: CANCELLED | OUTPATIENT
Start: 2019-05-14

## 2019-05-14 NOTE — PROGRESS NOTES
Chief Complaint   Patient presents with   • COPD     med refills     Subjective   Nona Hernandez is a 68 y.o. female who presents to the office for COPD routine follow up, needs refills of meds. Also is well overdue for INR and is on 5mg warfarin daily for history of PE.     The following portions of the patient's history were reviewed and updated as appropriate: allergies, current medications, past family history, past medical history, past social history, past surgical history and problem list.    History of Present Illness   [facility] - copd, history of PE, hyperlipidemia, depression, diabetes, hypertension, memory impairment, insomnia, tobacco dependence, gerd     Copd - managed with advair, proair, spiriva inhalers needs some refills today.  Has never quit smoking cigarettes, is a daily tobacco user  50 years x 1PPD  For 50 pack year history.     History of PE - managed with coumadin-Last INR 1.82 on 1/14/19  Due to repeat INR today  Current dose of coumadin 5mg nightly     Diabetes - managed with metformin  Followed by Dr Nuno   Cataract surgery done in [place]  Diabetic eye exam current  Diabetic foot exam is current  Hyperlipidemia - managed with zocor  B12 deficiency - with injections, managed every month     Hypertension - controlled, managed with cozaar     Tobacco dependence - currently smoking 1ppd           Past Medical History:   Diagnosis Date   • Abdominal pain    • Acute bronchitis    • Acute pharyngitis    • Acute sinusitis    • Acute URI    • Allergic rhinitis    • Anticoagulant Therapy    • Asthma    • Chronic laryngitis    • Chronic obstructive lung disease (CMS/HCC)    • Contusion of rib    • Cough    • Dementia    • Dizziness    • Emphysema    • Epigastric pain    • Esophagitis     grade III   • Essential hypertension    • Fatigue    • Generalized abdominal pain    • GERD (gastroesophageal reflux disease)    • Helicobacter pylori gastritis    • Hoarse    • Hyperlipidemia    • Insomnia    • Low  "back pain    • Nausea    • Pneumonia- resolved    • Polyp of large intestine    • Pulmonary embolus (CMS/HCC)    • Serous otitis media    • Smoker    • Solitary lung nodule    • Stroke (CMS/HCC)    • Tobacco dependence syndrome    • Type 2 diabetes mellitus (CMS/HCC)    • Urinary incontinence    • Vertigo    • Wheezing           Family History   Problem Relation Age of Onset   • Other Mother         Blood loss   • Breast cancer Maternal Grandmother         Review of Systems   Constitutional: Positive for diaphoresis (at night in her sleep) and fatigue. Negative for appetite change, chills and fever.   HENT: Negative for congestion, ear pain, rhinorrhea and sore throat.    Eyes: Negative for pain.   Respiratory: Positive for cough (chronic), shortness of breath (chronic) and wheezing (chronic).    Cardiovascular: Negative for chest pain and palpitations.   Gastrointestinal: Negative for abdominal pain, constipation, diarrhea and nausea.   Genitourinary: Negative for dysuria.   Musculoskeletal: Negative for back pain, joint swelling and neck pain.   Skin: Negative for rash.   Neurological: Negative for dizziness and headaches.   All other systems reviewed and are negative.      Objective   Vitals:    05/14/19 1356   BP: 124/70   BP Location: Left arm   Patient Position: Sitting   Cuff Size: Adult   Pulse: 104   Resp: 16   Temp: 97.8 °F (36.6 °C)   TempSrc: Oral   SpO2: 96%   Weight: 61.7 kg (136 lb)   Height: 157.5 cm (62.01\")   PainSc: 0-No pain     Physical Exam   Constitutional: She is oriented to person, place, and time. She appears well-developed and well-nourished.   HENT:   Head: Normocephalic and atraumatic.   Right Ear: External ear normal.   Left Ear: External ear normal.   Nose: No sinus tenderness. Right sinus exhibits no maxillary sinus tenderness and no frontal sinus tenderness. Left sinus exhibits no maxillary sinus tenderness and no frontal sinus tenderness.   Mouth/Throat: Mucous membranes are normal. " No posterior oropharyngeal erythema.   Eyes: Conjunctivae are normal. Pupils are equal, round, and reactive to light.   Neck: Normal range of motion. Neck supple.   Cardiovascular: Normal rate, regular rhythm, normal heart sounds and intact distal pulses. Exam reveals no gallop and no friction rub.   No murmur heard.  Pulmonary/Chest: Effort normal. No accessory muscle usage. No respiratory distress. She has decreased breath sounds (left lower lung anterior with diminished sounds and coarse fluid noise on expiration.). She has wheezes. She has no rhonchi. She has rales in the left lower field. She exhibits no tenderness.   Abdominal: Soft. Bowel sounds are normal.   Musculoskeletal: Normal range of motion. She exhibits no edema, tenderness or deformity.   Lymphadenopathy:     She has no cervical adenopathy.   Neurological: She is alert and oriented to person, place, and time.   Skin: Skin is warm and dry. Capillary refill takes 2 to 3 seconds. No erythema. No pallor.   Psychiatric: She has a normal mood and affect. Her behavior is normal. Judgment and thought content normal.   Nursing note and vitals reviewed.      Assessment/Plan   Nona was seen today for copd.    Diagnoses and all orders for this visit:    Current use of long term anticoagulation  -     Protime-INR    Encounter for screening for lung cancer  -     CT Chest Low Dose Wo; Future    Cough  -     XR Chest 2 View    Chronic obstructive pulmonary disease, unspecified COPD type (CMS/[facility])  -     albuterol sulfate HFA (PROAIR HFA) 108 (90 Base) MCG/ACT inhaler; Inhale 2 puffs Every 4 (Four) Hours As Needed for Wheezing.  -     fluticasone-salmeterol (ADVAIR DISKUS) 250-50 MCG/DOSE DISKUS; Inhale 1 puff 2 (Two) Times a Day.  -     XR Chest 2 View    Personal history of nicotine dependence   -     CT Chest Low Dose Wo; Future    Cyanocobalamin deficiency  -     Vitamin B12; Future    Type 2 diabetes mellitus without complication, without long-term current  use of insulin (CMS/[facility])  -     Hemoglobin A1c  -     Basic Metabolic Panel    Essential hypertension    Other orders  -     Cancel: albuterol sulfate  (90 Base) MCG/ACT inhaler           PHQ-2/PHQ-9 Depression Screening 5/14/2019   Little interest or pleasure in doing things 0   Feeling down, depressed, or hopeless 0   Trouble falling or staying asleep, or sleeping too much -   Feeling tired or having little energy -   Poor appetite or overeating -   Feeling bad about yourself - or that you are a failure or have let yourself or your family down -   Trouble concentrating on things, such as reading the newspaper or watching television -   Moving or speaking so slowly that other people could have noticed. Or the opposite - being so fidgety or restless that you have been moving around a lot more than usual -   Thoughts that you would be better off dead, or of hurting yourself in some way -   Total Score 0       Crystal SHARRON Chew         Return in about 3 months (around 8/14/2019).    There are no Patient Instructions on file for this visit.    No visits with results within 4 Month(s) from this visit.   Latest known visit with results is:   Lab on 01/14/2019   Component Date Value Ref Range Status   • Protime 01/14/2019 20.4* 11.1 - 15.3 Seconds Final   • INR 01/14/2019 1.82* 0.80 - 1.20 Final   • Hemoglobin A1C 01/14/2019 7.5* 4 - 5.6 % Final   • Glucose 01/14/2019 177* 74 - 99 mg/dL Final   • BUN 01/14/2019 6* 7 - 17 mg/dL Final   • Creatinine 01/14/2019 0.56  0.52 - 1.04 mg/dL Final   • Sodium 01/14/2019 135* 137 - 145 mmol/L Final   • Potassium 01/14/2019 4.2  3.4 - 5.0 mmol/L Final   • Chloride 01/14/2019 96* 98 - 107 mmol/L Final   • CO2 01/14/2019 32.0* 22.0 - 30.0 mmol/L Final   • Calcium 01/14/2019 9.5  8.4 - 10.2 mg/dL Final   • Total Protein 01/14/2019 7.5  6.3 - 8.2 g/dL Final   • Albumin 01/14/2019 4.80  3.50 - 5.00 g/dL Final   • ALT (SGPT) 01/14/2019 17  <=35 U/L Final   • AST (SGOT) 01/14/2019  28  14 - 36 U/L Final   • Alkaline Phosphatase 01/14/2019 63  38 - 126 U/L Final   • Total Bilirubin 01/14/2019 0.7  0.2 - 1.3 mg/dL Final   • eGFR Non African Amer 01/14/2019 107* 39 - 90 mL/min/1.73 Final   • Globulin 01/14/2019 2.7  2.3 - 3.5 gm/dL Final   • A/G Ratio 01/14/2019 1.8  1.1 - 1.8 g/dL Final   • BUN/Creatinine Ratio 01/14/2019 10.7  7.0 - 25.0 Final   • Anion Gap 01/14/2019 7.0  5.0 - 15.0 mmol/L Final   • WBC 01/14/2019 10.07* 3.20 - 9.80 10*3/mm3 Final   • RBC 01/14/2019 4.42  3.77 - 5.16 10*6/mm3 Final   • Hemoglobin 01/14/2019 15.4  12.0 - 15.5 g/dL Final   • Hematocrit 01/14/2019 47.6* 35.0 - 45.0 % Final   • MCV 01/14/2019 107.7* 80.0 - 98.0 fL Final   • MCH 01/14/2019 34.8* 26.5 - 34.0 pg Final   • MCHC 01/14/2019 32.4  31.4 - 36.0 g/dL Final   • RDW 01/14/2019 13.2  11.5 - 14.5 % Final   • RDW-SD 01/14/2019 51.1* 36.4 - 46.3 fl Final   • MPV 01/14/2019 10.2  8.0 - 12.0 fL Final   • Platelets 01/14/2019 585* 150 - 450 10*3/mm3 Final   • Scan Slide 01/14/2019    Final    See Manual Differential Results   • Neutrophil % 01/14/2019 71.0  37.0 - 80.0 % Final   • Lymphocyte % 01/14/2019 24.0  10.0 - 50.0 % Final   • Monocyte % 01/14/2019 4.0  0.0 - 12.0 % Final   • Eosinophil % 01/14/2019 1.0  0.0 - 7.0 % Final   • Neutrophils Absolute 01/14/2019 7.15  2.00 - 8.60 10*3/mm3 Final   • Lymphocytes Absolute 01/14/2019 2.42  0.60 - 4.20 10*3/mm3 Final   • Monocytes Absolute 01/14/2019 0.40  0.00 - 0.90 10*3/mm3 Final   • Eosinophils Absolute 01/14/2019 0.10  0.00 - 0.70 10*3/mm3 Final   • Macrocytes 01/14/2019 Slight/1+  None Seen Final   • WBC Morphology 01/14/2019 Normal  Normal Final   • Platelet Estimate 01/14/2019 Increased  Normal Final   ]

## 2019-05-14 NOTE — TELEPHONE ENCOUNTER
----- Message from SHARRON Conley sent at 5/14/2019  3:27 PM CDT -----  Notify pt of elevated blood sugar of 239, kidney function and liver functions and other electrolytes are normal. Other labs pending. Thanks bridgerw

## 2019-05-15 LAB
HBA1C MFR BLD: 7.5 % (ref 4.8–5.6)
VIT B12 BLD-MCNC: 758 PG/ML (ref 211–946)

## 2019-05-16 ENCOUNTER — TELEPHONE (OUTPATIENT)
Dept: FAMILY MEDICINE CLINIC | Facility: CLINIC | Age: 69
End: 2019-05-16

## 2019-05-16 DIAGNOSIS — E11.9 TYPE 2 DIABETES MELLITUS WITHOUT COMPLICATION, WITHOUT LONG-TERM CURRENT USE OF INSULIN (HCC): ICD-10-CM

## 2019-05-16 NOTE — TELEPHONE ENCOUNTER
Pt is aware of results and the change to metformin pt was told if she could not remember to have her  call us

## 2019-05-16 NOTE — TELEPHONE ENCOUNTER
----- Message from SHARRON Conley sent at 5/16/2019 10:16 AM CDT -----  Please notify pt of normal chest xray, thanks clw

## 2019-05-30 ENCOUNTER — APPOINTMENT (OUTPATIENT)
Dept: ONCOLOGY | Facility: HOSPITAL | Age: 69
End: 2019-05-30

## 2019-05-30 ENCOUNTER — APPOINTMENT (OUTPATIENT)
Dept: ONCOLOGY | Facility: CLINIC | Age: 69
End: 2019-05-30

## 2020-05-11 DIAGNOSIS — J44.9 CHRONIC OBSTRUCTIVE PULMONARY DISEASE, UNSPECIFIED COPD TYPE (HCC): ICD-10-CM

## 2020-05-11 RX ORDER — ALBUTEROL SULFATE 90 UG/1
AEROSOL, METERED RESPIRATORY (INHALATION)
Qty: 18 G | Refills: 11 | Status: SHIPPED | OUTPATIENT
Start: 2020-05-11

## 2020-07-29 DIAGNOSIS — J44.9 CHRONIC OBSTRUCTIVE PULMONARY DISEASE, UNSPECIFIED COPD TYPE (HCC): ICD-10-CM

## 2021-07-06 DIAGNOSIS — J44.9 CHRONIC OBSTRUCTIVE PULMONARY DISEASE, UNSPECIFIED COPD TYPE (HCC): ICD-10-CM
